# Patient Record
Sex: MALE | Race: WHITE | NOT HISPANIC OR LATINO | Employment: FULL TIME | URBAN - METROPOLITAN AREA
[De-identification: names, ages, dates, MRNs, and addresses within clinical notes are randomized per-mention and may not be internally consistent; named-entity substitution may affect disease eponyms.]

---

## 2017-04-10 ENCOUNTER — ALLSCRIPTS OFFICE VISIT (OUTPATIENT)
Dept: OTHER | Facility: OTHER | Age: 42
End: 2017-04-10

## 2017-07-03 ENCOUNTER — ALLSCRIPTS OFFICE VISIT (OUTPATIENT)
Dept: OTHER | Facility: OTHER | Age: 42
End: 2017-07-03

## 2017-08-10 ENCOUNTER — ALLSCRIPTS OFFICE VISIT (OUTPATIENT)
Dept: OTHER | Facility: OTHER | Age: 42
End: 2017-08-10

## 2018-01-03 ENCOUNTER — OFFICE VISIT (OUTPATIENT)
Dept: URGENT CARE | Facility: CLINIC | Age: 43
End: 2018-01-03
Payer: COMMERCIAL

## 2018-01-03 PROCEDURE — 99213 OFFICE O/P EST LOW 20 MIN: CPT

## 2018-01-06 NOTE — PROGRESS NOTES
Assessment   1  Abrasion of right cornea, initial encounter (918 1) (S05 01XA)   2  Abrasion of sclera, initial encounter (918 2) (C04 0C7Z)    Plan   Abrasion of right cornea, initial encounter, Abrasion of sclera, initial encounter    · Eye Drops Allergy Relief 0 05-0 25 % Ophthalmic Solution; INSTILL 1 DROP INTO    RIGHT EYE 4 TIMES DAILY   · Ondansetron HCl - 4 MG Oral Tablet (Zofran); TAKE 1 TABLET Every 6 hours PRN    nausea/vomiting   · Tobramycin-Dexamethasone 0 3-0 1 % Ophthalmic Suspension; APPLY 1 DROP    Every 6 hours  Nausea and vomiting    · Ondansetron 4 MG Oral Tablet Disintegrating    Discussion/Summary   Discussion Summary:    26-year-old male who sustained abrasions of the cornea and sclera per fluorescein - wood lamp exam  prescribed TobraDex eye ointment for prophylaxis against infection and irritation suppression  Expected to heal within 2-3 days  Zofran prescribed for nausea  Advised to avoid wearing contacts, rubbing his eyes  May wear eye glasses / shades to protect against photosensitivity  Return precautions given to patient  Medication Side Effects Reviewed: Possible side effects of new medications were reviewed with the patient/guardian today  Understands and agrees with treatment plan: The treatment plan was reviewed with the patient/guardian  The patient/guardian understands and agrees with the treatment plan    Counseling Documentation With Imm: The patient was counseled regarding diagnostic results,-- instructions for management,-- impressions  Chief Complaint   1  Eye Pain  Chief Complaint Free Text Note Form: Rt eye pain      History of Present Illness   HPI: 26-year-old otherwise healthy male presents to the urgent care due to right eye trauma and subsequent vomiting  Around 10:30 a m  while at work he was cutting a piece of plastic type and a piece flew off and hit his right eye   Since then he has been trying to irrigate the right eye in an attempt to resolve the sensation of a foreign object in the eye  He still feels that a foreign object is in the eye and has blurry vision  As such he decided to come to the urgent care for evaluation  while here he became nauseated and started vomiting  Hospital Based Practices Required Assessment:      Pain Assessment      the patient states they have pain  The pain is located in the right eye  (on a scale of 0 to 10, the patient rates the pain at 10 )      Abuse And Domestic Violence Screen       Yes, the patient is safe at home  -- The patient states no one is hurting them  Depression And Suicide Screen  No, the patient has not had thoughts of hurting themself  No, the patient has not felt depressed in the past 7 days  Prefered Language is  english  Primary Language is  english  Review of Systems   Focused-Male:      Constitutional: no fever-- and-- no chills  ENT: no earache  Cardiovascular: no chest pain  Respiratory: no shortness of breath-- and-- no cough  Gastrointestinal: nausea-- and-- vomiting, but-- no abdominal pain,-- no constipation-- and-- no diarrhea  Neurological: headache-- and-- dizziness  ROS Reviewed:    ROS reviewed  Active Problems   1  Bipolar I disorder, most recent episode mixed, severe with psychotic features (296 64)     (F31 64)   2  Drug dependence, in remission (304 93) (F19 21)   3  Finger injury, left, initial encounter (959 5) (B46 80AL)   4  Penetrating foreign body of skin of index finger, initial encounter (915 6) (S60 458A)   5  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)    Past Medical History   Active Problems And Past Medical History Reviewed: The active problems and past medical history were reviewed and updated today  Family History   Mother    1  Family history of Colon cancer (153 9) (C18 9)   2  Family history of Hypertension (401 9) (I10)  Father    3   Family history of Hypertension (401 9) (I10)  Maternal Grandmother    4  Family history of Diabetes mellitus (250 00) (E11 9)  Family History Reviewed: The family history was reviewed and updated today  Social History    · Never a smoker   · Tobacco use (305 1) (Z72 0)  Social History Reviewed: The social history was reviewed and updated today  Current Meds    1  AndroGel 25 MG/2 5GM (1%) Transdermal Gel; Therapy: (ADHZSDKD:[id number]) to Recorded   2  BuPROPion HCl ER (XL) 150 MG Oral Tablet Extended Release 24 Hour; TAKE 3     TABLETS DAILY; Therapy: [id number] to (Ta Juan)  Requested for: 06MVK0577; Last     Rx:02Jun2014 Ordered   3  Suboxone 8-2 MG Sublingual Film; Take 1 25 daily SL as directed; Therapy: 27ZYN5359 to (Evaluate:63Mxs3584); Last Rx:02Jun2014 Ordered  Medication List Reviewed: The medication list was reviewed and updated today  Allergies   1  No Known Drug Allergies    Vitals   Signs   Recorded: 99ZEH3127 03:58PM   Temperature: 96 9 F  Heart Rate: 87  Respiration: 16  Systolic: 524  Diastolic: 70  Height: 5 ft 6 5 in  Weight: 172 lb   BMI Calculated: 27 35  BSA Calculated: 1 89  O2 Saturation: 98  Pain Scale: 10    Physical Exam        Constitutional      General appearance: Abnormal  -- Vomiting and been; discomfort from right eye pain and headache  Eyes      Conjunctiva and lids: Abnormal        Pupils and irises: Equal, round and reactive to light  Ears, Nose, Mouth, and Throat      External inspection of ears and nose: Normal        Pulmonary      Respiratory effort: No increased work of breathing or signs of respiratory distress  Auscultation of lungs: Clear to auscultation  Cardiovascular      Auscultation of heart: Normal rate and rhythm, normal S1 and S2, without murmurs  Musculoskeletal      Gait and station: Normal        Skin      Skin and subcutaneous tissue: Normal without rashes or lesions         Psychiatric      Orientation to person, place and time: Normal  Mood and affect: Normal        Results/Data   Diagnostic Studies Reviewed:      Diagnostic Review Fluorescin-wood lap exam: a small abrasion of the central cornea; 2 small abrasions at 7 o'clock on the sclera  No evidence of foreign body        Signatures    Electronically signed by : TIAN Salmeron ; Derick  3 2018  4:58PM EST                       (Author)     Electronically signed by : Lavern John DO; Jan 5 2018  4:43PM EST                       (Co-author)

## 2018-01-10 NOTE — PROGRESS NOTES
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  Mutations in PMS2 are associated with a diagnosis of Ingram syndrome (LS)  Cancer risks associated with PMS2 mutations are typically lower than those seen with other LS genes  Individuals who carry mutations in PMS2 have a 15%-20% lifetime risk to develop colorectal cancer  Female PMS2 carriers have a 15% risk to develop endometrial cancer and a 6% risk for ovarian cancer  Risks are also elevated for gastric, renal pelvis, small bowel, ureter, and brain cancers  We reviewed screening and management recommendations for individuals with PMS2 related Ingram Syndrome  Recommended colorectal cancer screening is colonoscopy performed every 1-2 years beginning at the age of 22  Screening for other Ingram Syndrome related cancers may also be considered based on the family history  We discussed that all first degree relatives have a 50% chance to carry the same PMS2 mutation  The patient's siblings and children should consider the option of genetic testing to determine appropriate medical management  The patient's questions were answered  He declined a follow-up appointment for further discussion at this time  He was encouraged to call with any future questions or concerns  Signatures   Electronically signed by :  Leo Coronado, [facility]; Mar  1 2016  3:06PM EST                       (Author)

## 2018-01-10 NOTE — PSYCH
Provider Comments  Provider Comments:   MSW called ct  "He forgot about his appt  He is very busy at work  He is doing good  Reminded ct of his next appt   He put it is in his phone "      Signatures   Electronically signed by : Paty Gannon LCSW; Aug 10 2017  3:20PM EST                       (Author)

## 2018-01-11 NOTE — PSYCH
Progress Note  Psychotherapy Provided [facility]: Individual Psychotherapy 45 minutes provided today  Goals addressed in session:   D: MSW met with pt for session  His last session was months ago  "He has been doing OK " Discussed home and work  He went on a weeks vacation with friends to an [facility] convention in [facility]  "This was big for his wife regarding trust and his history of addiction " Developed tx plan  A: Mod progress overall  P: To begin working on his tx plan and to follow up more regularly  Pain Scale and Suicide Risk [facility]: Current Pain Assessment: no pain   Current suicide risk is low   Behavioral Health Treatment Plan [facility]: Diagnosis and Treatment Plan explained to patient, patient relates understanding diagnosis and is agreeable to Treatment Plan  Assessment    1  Bipolar I disorder, most recent episode mixed, severe with psychotic features (296 64)   (F31 64)   2  Drug dependence, in remission (304 93) (F19 21)   3   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

## 2018-01-12 NOTE — PSYCH
Progress Note  Psychotherapy Provided [facility]: Individual Psychotherapy 45 minutes provided today  Goals addressed in session:   goal 1  D: MSW met with pt for session  Discussed work, home, family, parents, and work  "Everything is status quo " Has been having problems with a co-worker who is not doing his share and "they (boss) isn't doing anything about it " Also discussed his parents  MSW pointed out the similarities between his co-worker and his parents, both are [employer]  A: Mod progress on goal  P: To focus on self and not co-worker  Pain Scale and Suicide Risk [facility]: Current Pain Assessment: no pain   Current suicide risk is low   Behavioral Health Treatment Plan [facility]: Diagnosis and Treatment Plan explained to patient, patient relates understanding diagnosis and is agreeable to Treatment Plan  Assessment    1  Bipolar I disorder, most recent episode mixed, severe with psychotic features (296 64)   (F31 64)   2   Drug dependence, in remission (304 93) (F19 21)    Signatures   Electronically signed by : Payton Morgan LCSW; Apr 6 2016  2:14PM EST                       (Author)

## 2018-01-15 NOTE — PSYCH
Progress Note  Psychotherapy Provided [facility]: Individual Psychotherapy 45 minutes provided today  Goals addressed in session:   D: MSW met with pt for session  He is decreasing the Suboxone because "he wants to  (Discussed reasons why)  The next step is the shot " He has been very busy with his kids ie, their activities and redoing their rooms  Work is going good  Regarding psych meds he is only on Wellbutrin  "He knows he has to watch it and not push to stay up late to do more things " He had a screening done due to his family history of colon cancer  He has the gene  Developed tx plan  A: Ct appears stable  P: Begin addressing tx plan goals  Pain Scale and Suicide Risk [facility]: Current Pain Assessment: no pain   Current suicide risk is low   Behavioral Health Treatment Plan [facility]: Diagnosis and Treatment Plan explained to patient, patient relates understanding diagnosis and is agreeable to Treatment Plan  Assessment    1  Bipolar I disorder, most recent episode mixed, severe with psychotic features (296 64)   (F31 64)   2   Drug dependence, in remission (304 93) (F19 21)    Signatures   Electronically signed by : Jamie Ballard LCSW; Feb 17 2016  4:11PM EST                       (Author)

## 2018-01-15 NOTE — PSYCH
1  Bipolar I disorder, most recent episode mixed, severe with psychotic features (296 64)   (F31 64)   2  Drug dependence, in remission (304 93) (F19 21)       Date of Current Treatment Plan: 2/16  Strengths/Personal Resources for Self Care: creative  Current Challenges/Problems/Needs: I used to not be good  I am on Suboxone  I have medical issues  Long Term Goals:   I want to continue to do good  Target Date: 6/16      I want to continue the Suboxone reduction      Target Date: 6/16      I want to take care of self  Target Date: 6/16      Short Term Objectives:   Goal 1:   I will continue to stay stay busy  I will continue to with my hobby of [email or web address] cars  I will stay involved with my kids activities  Target Date: 2/16      Goal 2:   I will continue to decrease the amount  I will continue to follow with the Dr   I will remember the shot is there as back up  Target Date: 2/16      Goal 3:   I will take meds as prescribed  I will be aware and monitor my moods  I will follow up with the Dr  regarding the preventive cancer gene programs  Target Date: 3/16      GOAL 1: Modality: Individual 1 x per month                       The first scheduled review date is 6/16  The expected length of service is 6 mons  Patient Signature: _________________________________ Date/Time: ______________        1  Bipolar I disorder, most recent episode mixed, severe with psychotic features (296 64)   (F31 64)   2  Drug dependence, in remission (304 93) (F19 21)   3  Finger injury, left, initial encounter (959 5) (D47 65EI)   4  Penetrating foreign body of skin of index finger, initial encounter (915 6) (S60 458A)   5   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

## 2018-01-16 NOTE — PROGRESS NOTES
Discussion/Summary  Discussion Summary:   The patient was seen for genetic counseling to discuss possible genetic testing, relative to his family history of Ingram syndrome  Family information was reviewed and a 3 generation pedigree was drawn  The patient's mother was diagnosed with ovarian cancer and endometrial cancer at the age of 62, following her diagnosis she underwent genetic testing which indicated that she carries a mutation in the PMS 2 gene  In addition, the patient's maternal grandfather was diagnosed with colon cancer at the age of 48 and a maternal aunt was diagnosed with AML at the age of 62  We discussed cancer risks associated with Ingram syndrome, medical management recommendations, and potential risks for family members  We reviewed the genetic testing process, insurance concerns, and possible test results  The patient elected to pursue genetic testing for the familial PMS 2 mutation  Informed consent was obtained and a DNA sample was collected  The sample was sent to [address] [place] [address] for PMS 2 single site genetic testing  Test results should be available in approximately 2-3 weeks  The patient elected to have results disclosed by phone and he will be contacted once results are available  Signatures   Electronically signed by :  Carlos Do, [facility]; Feb 10 2016  2:07PM EST                       (Author)

## 2018-01-16 NOTE — PSYCH
Provider Comments  Provider Comments:   MSW left message for ct due to no showing appt        Signatures   Electronically signed by : Madeline Hinton LCSW; Jul  3 2017  3:28PM EST                       (Author)

## 2018-01-17 NOTE — PSYCH
Treatment Plan Tracking    #1 Treatment Plan not completed within required time limits due to: Client did not schedule an appointment within 15 days of initial assessment  , Other: Last seen 5 months ago, reviewed history            Signatures   Electronically signed by : Beni Rodriguez LCSW; Sep 14 2016  3:08PM EST                       (Author)

## 2018-01-17 NOTE — PSYCH
Progress Note  Psychotherapy Provided [facility]: Individual Psychotherapy 45 minutes provided today  Goals addressed in session:   goals 1,2,3  D: MSW met with pt  He was last seen in April of this year  Reviewed history since then  "He is taking his meds and continues with the RC cars  His wife has finally accepted it  He has been featured in [employer] and has 6 sponsors " Work is going good  Discussed his relationship with his mother since she became a born again [employer]18 [employer], which is strained, and how it was when he was growing up, close  He had to ask his aunt for money again  A: Pt seems to accept the difference in his mother  Mod progress all goals  P: To think of goals for next tx plan  Pain Scale and Suicide Risk [facility]: Current Pain Assessment: no pain   Current suicide risk is low   Behavioral Health Treatment Plan [employer]: Diagnosis and Treatment Plan explained to patient, patient relates understanding diagnosis and is agreeable to Treatment Plan  Assessment    1  Bipolar I disorder, most recent episode mixed, severe with psychotic features (296 64)   (F31 64)   2  Drug dependence, in remission (304 93) (F19 21)   3   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

## 2018-01-17 NOTE — PSYCH
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)       Date of Current Treatment Plan: 4/10/17  Strengths/Personal Resources for Self Care: creative  Area of Needs: I don't' make eye contact  Long Term Goals:   I want to make eye contact with people when I am holding a conversation with them  Target Date: 8/17              Short Term Objectives:   Goal 1:   I will make eye contact with the person when I say, "Hi "  I will ask wife to remind me to look at her when I am speaking to her  I will look at the person when I realize I am not looking at them  Target Date: 5/17              GOAL 1: Modality: Individual 1 x per month                           The first scheduled review date is 8/17  The expected length of service is 6 months  Patient Signature: _________________________________ Date/Time: ______________        1  Bipolar I disorder, most recent episode mixed, severe with psychotic features (296 64)   (F31 64)   2  Drug dependence, in remission (304 93) (F19 21)   3  Finger injury, left, initial encounter (959 5) (Z01 66YE)   4  Penetrating foreign body of skin of index finger, initial encounter (915 6) (S60 458A)   5   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

## 2018-01-23 VITALS
RESPIRATION RATE: 16 BRPM | WEIGHT: 172 LBS | DIASTOLIC BLOOD PRESSURE: 70 MMHG | HEIGHT: 67 IN | TEMPERATURE: 96.9 F | HEART RATE: 87 BPM | SYSTOLIC BLOOD PRESSURE: 156 MMHG | BODY MASS INDEX: 27 KG/M2 | OXYGEN SATURATION: 98 %

## 2018-05-16 ENCOUNTER — DOCUMENTATION (OUTPATIENT)
Dept: PSYCHIATRY | Facility: CLINIC | Age: 43
End: 2018-05-16

## 2018-05-16 NOTE — PROGRESS NOTES
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) (F90 0)        Subjective:     Patient ID: Balbir Pires is a 36 y o  male  Outpatient Discharge Summary:   Admission Date: 2015  [facility] was referred by Dr Cely Torres  Discharge Date: 5/9/18    Discharge Diagnosis:      1  Bipolar I disorder, most recent episode mixed, severe with psychotic features (296 64)   (F31 64)   2  Drug dependence, in remission (304 93) (F19 21)   3  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) (F90 0)     Treating Physician: Dr Tera Ellis  Treatment Complications: none  Presenting Problem: History of bipolar and drug dependency  Course of treatment includes:    individual therapy    Treatment Progress: fair  He made progress in treatment  He had been clean for a number of years and was stable mentally for some time  A letter was sent due to no pending appts  It appears he had made appts but then cancelled them  Criteria for Discharge: No pending appts  Aftercare recommendations include Follow up with  for meds  Discharge Medications include:  Current Outpatient Prescriptions:     buprenorphine-naloxone (SUBOXONE) 8-2 mg per SL tablet, Place 1 tablet under the tongue daily  , Disp: , Rfl:     buPROPion (FORFIVO XL) 450 MG 24 hr tablet, Take 450 mg by mouth daily  , Disp: , Rfl:     Testosterone (ANDROGEL PUMP TD), Place on the skin, Disp: , Rfl:     Prognosis: fair

## 2018-06-08 ENCOUNTER — HOSPITAL ENCOUNTER (EMERGENCY)
Facility: HOSPITAL | Age: 43
Discharge: HOME | End: 2018-06-08
Attending: EMERGENCY MEDICINE
Payer: OTHER MISCELLANEOUS

## 2018-06-08 VITALS
RESPIRATION RATE: 18 BRPM | BODY MASS INDEX: 25.71 KG/M2 | TEMPERATURE: 96.7 F | WEIGHT: 160 LBS | DIASTOLIC BLOOD PRESSURE: 65 MMHG | HEIGHT: 66 IN | OXYGEN SATURATION: 96 % | SYSTOLIC BLOOD PRESSURE: 103 MMHG | HEART RATE: 65 BPM

## 2018-06-08 DIAGNOSIS — S61.211A LACERATION OF LEFT INDEX FINGER: Primary | ICD-10-CM

## 2018-06-08 PROCEDURE — 99282 EMERGENCY DEPT VISIT SF MDM: CPT

## 2018-06-08 PROCEDURE — 90471 IMMUNIZATION ADMIN: CPT

## 2018-06-08 PROCEDURE — 90715 TDAP VACCINE 7 YRS/> IM: CPT | Performed by: PHYSICIAN ASSISTANT

## 2018-06-08 RX ORDER — LIDOCAINE HYDROCHLORIDE 10 MG/ML
5 INJECTION, SOLUTION EPIDURAL; INFILTRATION; INTRACAUDAL; PERINEURAL ONCE
Status: COMPLETED | OUTPATIENT
Start: 2018-06-08 | End: 2018-06-08

## 2018-06-08 RX ORDER — GINSENG 100 MG
1 CAPSULE ORAL ONCE
Status: COMPLETED | OUTPATIENT
Start: 2018-06-08 | End: 2018-06-08

## 2018-06-08 RX ADMIN — Medication 1 SMALL APPLICATION: at 13:33

## 2018-06-08 RX ADMIN — TETANUS TOXOID, REDUCED DIPHTHERIA TOXOID AND ACELLULAR PERTUSSIS VACCINE, ADSORBED 0.5 ML: 5; 2.5; 8; 8; 2.5 SUSPENSION INTRAMUSCULAR at 13:13

## 2018-06-08 RX ADMIN — LIDOCAINE HYDROCHLORIDE 5 ML: 10 INJECTION, SOLUTION EPIDURAL; INFILTRATION; INTRACAUDAL; PERINEURAL at 13:33

## 2018-06-08 NOTE — ED PROVIDER NOTES
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 and he cut his finger on sheet metal   Patient denies numbness/tingling  History provided by:  Patient  Finger Laceration   Location:  Finger  Finger laceration location:  L index finger  Length:  1 5cm  Depth:  Cutaneous  Quality: straight    Bleeding: controlled    Time since incident:  30 minutes  Laceration mechanism:  Metal edge  Pain details:     Quality:  Aching    Severity:  Mild    Timing:  Constant    Progression:  Unchanged  Foreign body present:  No foreign bodies  Relieved by:  Nothing  Worsened by:  Nothing  Ineffective treatments:  None tried  Tetanus status:  Unknown  Associated symptoms: no fever, no numbness, no redness, no swelling and no streaking        Prior to Admission Medications   Prescriptions Last Dose Informant Patient Reported? Taking? 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, and alcohol       Past Surgical History:   Procedure Laterality Date    SMALL INTESTINE SURGERY         History reviewed  No pertinent family history  I have reviewed and agree with the history as documented  Social History   Substance Use Topics    Smoking status: Former Smoker    Smokeless tobacco: Never Used    Alcohol use No        Review of Systems   Constitutional: Negative for chills and fever     Skin: Positive for wound  Neurological: Negative for dizziness, weakness and numbness  All other systems reviewed and are negative  Physical Exam  Physical Exam   Constitutional: He is oriented to person, place, and time  He appears well-developed and well-nourished  HENT:   Head: Normocephalic and atraumatic  Eyes: Conjunctivae are normal    Neck: Normal range of motion  Cardiovascular: Normal rate  Pulses:       Radial pulses are 2+ on the left side  Pulmonary/Chest: Effort normal    Musculoskeletal:        Left hand: He exhibits tenderness and laceration  He exhibits normal range of motion, no bony tenderness, normal capillary refill, no deformity and no swelling  Normal sensation noted  Normal strength noted  Hands:  Neurological: He is alert and oriented to person, place, and time  He has normal strength  No sensory deficit  Gait normal    Skin: Skin is warm and dry  Laceration (1 5cm linear laceration to left index finger  ) noted  No rash noted  He is not diaphoretic  No pallor  Nursing note and vitals reviewed        Vital Signs  ED Triage Vitals [06/08/18 1257]   Temperature Pulse Respirations Blood Pressure SpO2   (!) 96 7 °F (35 9 °C) 69 20 136/79 --      Temp src Heart Rate Source Patient Position - Orthostatic VS BP Location FiO2 (%)   -- -- -- -- --      Pain Score       4           Vitals:    06/08/18 1257   BP: 136/79   Pulse: 69       Visual Acuity  Visual Acuity      Most Recent Value   L Pupil Size (mm)  3   R Pupil Size (mm)  3          ED Medications  Medications   lidocaine (PF) (XYLOCAINE-MPF) 1 % injection 5 mL (5 mL Infiltration Given 6/8/18 1333)   bacitracin topical ointment 1 small application (1 small application Topical Given 6/8/18 1333)   tetanus-diphtheria-acellular pertussis (BOOSTRIX) IM injection 0 5 mL (0 5 mL Intramuscular Given 6/8/18 1313)       Diagnostic Studies  Results Reviewed     None                 No orders to display              Procedures  Lac Repair  Date/Time: 6/8/2018 1:20 PM  Performed by: Bettina Price by: Kelly Francis   Consent: Verbal consent obtained  Body area: upper extremity  Location details: left index finger  Laceration length: 1 5 cm  Foreign bodies: no foreign bodies  Tendon involvement: none  Nerve involvement: none  Vascular damage: no  Anesthesia: local infiltration    Anesthesia:  Local Anesthetic: lidocaine 1% without epinephrine  Anesthetic total: 2 mL      Procedure Details:  Preparation: Patient was prepped and draped in the usual sterile fashion  Irrigation solution: saline  Irrigation method: tap  Amount of cleaning: standard  Debridement: none  Degree of undermining: none  Skin closure: 4-0 nylon  Number of sutures: 5  Technique: simple  Approximation: close  Approximation difficulty: simple  Dressing: antibiotic ointment and gauze roll  Patient tolerance: Patient tolerated the procedure well with no immediate complications             Phone Contacts  ED Phone Contact    ED Course                               MDM  Number of Diagnoses or Management Options  Laceration of left index finger: minor  Patient Progress  Patient progress: improved    CritCare Time    Disposition  Final diagnoses:   Laceration of left index finger     Time reflects when diagnosis was documented in both MDM as applicable and the Disposition within this note     Time User Action Codes Description Comment    6/8/2018  1:29 PM Lanning Dakins Add [I58 [id number]] Laceration of left index finger       ED Disposition     ED Disposition Condition Comment    Discharge  [place] discharge to home/self care      Condition at discharge: Stable        Follow-up Information     Follow up With Specialties Details Why Contact Info    Shoshana Hicks, [address], Internal Medicine, Nurse Practitioner In 1 week For suture removal [address] [place] [address] Destinee Hurtado  [phone]            Patient's Medications Discharge Prescriptions    No medications on file     No discharge procedures on file      ED Provider  Electronically Signed by           Obey De La Cruz PA-C  06/08/18 5391

## 2018-06-08 NOTE — DISCHARGE INSTRUCTIONS
Rest, elevate hand  Tylenol/motrin for discomfort  Follow up with occupational medicine doctor in 7-10 days for suture removal   Keep wound clean and dry for 2 days, then clean daily with soap and water and apply antibiotic ointment and bandaid  Finger Laceration   WHAT YOU NEED TO KNOW:   A finger laceration is a deep cut in your skin  It is often caused by a sharp object, such as a knife, or blunt force to your finger  Your blood vessels, bones, joints, tendons, or nerves may also be injured  DISCHARGE INSTRUCTIONS:   Return to the emergency department if:   · Your wound comes apart  · Blood soaks through your bandage  · You have severe pain in your finger or hand  · Your finger is pale and cold  · You have sudden trouble moving your finger  · Your swelling suddenly gets worse  · You have red streaks on your skin coming from your wound  Contact your healthcare provider or hand specialist if:   · You have new numbness or tingling  · Your finger feels warm, looks swollen or red, and is draining pus  · You have a fever  · You have questions or concerns about your condition or care  Medicines: You may  need any of the following:  · Antibiotics  help prevent a bacterial infection  · Acetaminophen  decreases pain and fever  It is available without a doctor's order  Ask how much to take and how often to take it  Follow directions  Read the labels of all other medicines you are using to see if they also contain acetaminophen, or ask your doctor or pharmacist  Acetaminophen can cause liver damage if not taken correctly  Do not use more than 4 grams (4,000 milligrams) total of acetaminophen in one day  · Prescription pain medicine  may be given  Ask your healthcare provider how to take this medicine safely  Some prescription pain medicines contain acetaminophen  Do not take other medicines that contain acetaminophen without talking to your healthcare provider  Too much acetaminophen may cause liver damage  Prescription pain medicine may cause constipation  Ask your healthcare provider how to prevent or treat constipation  · Take your medicine as directed  Contact your healthcare provider if you think your medicine is not helping or if you have side effects  Tell him or her if you are allergic to any medicine  Keep a list of the medicines, vitamins, and herbs you take  Include the amounts, and when and why you take them  Bring the list or the pill bottles to follow-up visits  Carry your medicine list with you in case of an emergency  Self-care:   · Apply ice  on your finger for 15 to 20 minutes every hour or as directed  Use an ice pack, or put crushed ice in a plastic bag  Cover it with a towel before you apply it to your skin  Ice helps prevent tissue damage and decreases swelling and pain  · Elevate  your hand above the level of your heart as often as you can  This will help decrease swelling and pain  Prop your hand on pillows or blankets to keep it elevated comfortably  · Wear your splint as directed  A splint will decrease movement and stress on your wound  The splint may help your wound heal faster  Ask your healthcare provider how to apply and remove a splint  · Apply ointments to decrease scarring  Do not apply ointments until your healthcare provider says it is okay  You may need to wait until your wound is healed  Ask which ointment to buy and how often to use it  Wound care:   · Do not get your wound wet until your healthcare provider says it is okay  Do not soak your hand in water  Do not go swimming until your healthcare provider says it is okay  When your healthcare provider says it is okay, carefully wash around the wound with soap and water  Let soap and water run over your wound  Gently pat the area dry or allow it to air dry  · Change your bandages when they get wet, dirty, or after washing    Apply new, clean bandages as directed  Do not apply elastic bandages or tape too tightly  Do not put powders or lotions on your wound  · Apply antibiotic ointment as directed  Your healthcare provider may give you antibiotic ointment to put over your wound if you have stitches  If you have Strips-Strips over your wound, let them dry up and fall off on their own  If they do not fall off within 14 days, gently remove them  If you have glue over your wound, do not remove or pick at it  If your glue comes off, do not replace it with glue that you have at home  · Check your wound every day for signs of infection  Signs of infection include swelling, redness, or pus  Follow up with your healthcare provider or hand specialist in 2 days:  Write down your questions so you remember to ask them during your visits  © 2017 [address] Information is for End User's use only and may not be sold, redistributed or otherwise used for commercial purposes  All illustrations and images included in CareNotes® are the copyrighted property of A D [employer] M [employer]  or [employer]  The above information is an  only  It is not intended as medical advice for individual conditions or treatments  Talk to your doctor, nurse or pharmacist before following any medical regimen to see if it is safe and effective for you

## 2018-06-17 ENCOUNTER — HOSPITAL ENCOUNTER (EMERGENCY)
Facility: HOSPITAL | Age: 43
Discharge: HOME | End: 2018-06-17
Attending: EMERGENCY MEDICINE
Payer: OTHER MISCELLANEOUS

## 2018-06-17 DIAGNOSIS — L08.9 WOUND INFECTION: Primary | ICD-10-CM

## 2018-06-17 DIAGNOSIS — T14.8XXA WOUND INFECTION: Primary | ICD-10-CM

## 2018-06-17 PROCEDURE — 99282 EMERGENCY DEPT VISIT SF MDM: CPT

## 2018-06-17 RX ORDER — CEPHALEXIN 500 MG/1
500 CAPSULE ORAL 3 TIMES DAILY
Qty: 15 CAPSULE | Refills: 0 | Status: SHIPPED | OUTPATIENT
Start: 2018-06-17 | End: 2018-06-22

## 2018-06-17 RX ORDER — IBUPROFEN 600 MG/1
600 TABLET ORAL EVERY 8 HOURS PRN
Qty: 30 TABLET | Refills: 0 | Status: SHIPPED | OUTPATIENT
Start: 2018-06-17

## 2018-06-17 RX ORDER — CEPHALEXIN 250 MG/1
500 CAPSULE ORAL ONCE
Status: COMPLETED | OUTPATIENT
Start: 2018-06-17 | End: 2018-06-17

## 2018-06-17 RX ORDER — IBUPROFEN 600 MG/1
600 TABLET ORAL ONCE
Status: COMPLETED | OUTPATIENT
Start: 2018-06-17 | End: 2018-06-17

## 2018-06-17 RX ORDER — GINSENG 100 MG
1 CAPSULE ORAL ONCE
Status: COMPLETED | OUTPATIENT
Start: 2018-06-17 | End: 2018-06-17

## 2018-06-17 RX ADMIN — CEPHALEXIN 500 MG: 250 CAPSULE ORAL at 20:14

## 2018-06-17 RX ADMIN — IBUPROFEN 600 MG: 600 TABLET, FILM COATED ORAL at 20:15

## 2018-06-17 RX ADMIN — BACITRACIN ZINC 1 SMALL APPLICATION: 500 OINTMENT TOPICAL at 20:08

## 2018-06-17 NOTE — ED PROVIDER NOTES
History  Chief Complaint   Patient presents with    Wound Check     Patient presents to ED with complaints of puss and concerns of infection on right finger  Pt was seen 9 days ago for laceration and was sutured in this ED  35 yo male who had finger lac 9 days ago and was sutured here, supposed to get sutures out tomorrow but yesterday noted a little pain and swelling, today began with purulent discharge  No fever/chills  Pt has not been on any antibiotics, tetanus was updated  History provided by:  Patient   used: No    Wound Check    He was treated in the ED 5 to 10 days ago  Previous treatment in the ED includes laceration repair  There has been no treatment since the wound repair  Wound drainage status: purulent drainage  There is no redness present  There is new swelling present  There is new pain present  He has no difficulty moving the affected extremity or digit  Prior to Admission Medications   Prescriptions Last Dose Informant Patient Reported? Taking? 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, and alcohol       Past Surgical History:   Procedure Laterality Date    SMALL INTESTINE SURGERY         History reviewed  No pertinent family history  I have reviewed and agree with the history as documented  Social History   Substance Use Topics    Smoking status: Former Smoker    Smokeless tobacco: Never Used    Alcohol use No        Review of Systems   Constitutional: Negative for chills and fever  HENT: Negative for congestion and sore throat  Eyes: Negative for visual disturbance     Respiratory: Negative for shortness of breath and wheezing  Cardiovascular: Negative for chest pain and palpitations  Gastrointestinal: Negative for abdominal pain, diarrhea, nausea and vomiting  Genitourinary: Negative for dysuria  Musculoskeletal: Negative for neck pain and neck stiffness  Skin: Positive for wound (L index finger wound pain, discharge )  Negative for pallor and rash  Neurological: Negative for headaches  Psychiatric/Behavioral: Negative for confusion  All other systems reviewed and are negative  Physical Exam  Physical Exam   Constitutional: He is oriented to person, place, and time  He appears well-developed and well-nourished  No distress  HENT:   Head: Normocephalic and atraumatic  Right Ear: External ear normal    Left Ear: External ear normal    Mouth/Throat: Oropharynx is clear and moist    Eyes: EOM are normal    Neck: Neck supple  Cardiovascular: Normal rate and regular rhythm  No murmur heard  Pulmonary/Chest: Effort normal and breath sounds normal    Abdominal: Soft  Bowel sounds are normal  He exhibits no distension  There is no tenderness  Musculoskeletal: Normal range of motion  He exhibits no edema  Neurological: He is alert and oriented to person, place, and time  Skin: Skin is warm  No rash noted  No pallor  L index finger wound - poor approximation, no redness or discharge noted - NV intact distally, no lymphangitic streaking   Psychiatric: He has a normal mood and affect  His behavior is normal    Nursing note and vitals reviewed        Vital Signs  ED Triage Vitals   Temperature Pulse Respirations Blood Pressure SpO2   06/17/18 1954 06/17/18 1954 06/17/18 1954 06/17/18 1954 06/17/18 1954   97 5 °F (36 4 °C) 78 20 139/86 98 %      Temp Source Heart Rate Source Patient Position - Orthostatic VS BP Location FiO2 (%)   06/17/18 1954 06/17/18 1954 06/17/18 1954 06/17/18 1954 --   Tympanic Monitor Sitting Right arm       Pain Score       06/17/18 2015 1           Vitals: 06/17/18 1954 06/17/18 2000   BP: 139/86 130/84   Pulse: 78 80   Patient Position - Orthostatic VS: Sitting        Visual Acuity      ED Medications  Medications   bacitracin topical ointment 1 small application (1 small application Topical Given 6/17/18 2008)   cephalexin (KEFLEX) capsule 500 mg (500 mg Oral Given 6/17/18 2014)   ibuprofen (MOTRIN) tablet 600 mg (600 mg Oral Given 6/17/18 2015)       Diagnostic Studies  Results Reviewed     None                 No orders to display              Procedures  Suture Removal  Date/Time: 6/17/2018 8:03 PM  Performed by: Baljeet Georges by: Josef Sow     Patient location:  ED  Consent:     Consent obtained:  Verbal    Consent given by:  Patient    Risks discussed:  Bleeding, pain and wound separation    Alternatives discussed:  No treatment  Universal protocol:     Procedure explained and questions answered to patient or proxy's satisfaction: yes      Patient identity confirmed:  Verbally with patient  Location:     Laterality:  Left    Location:  Upper extremity    Upper extremity location:  Hand    Hand location:  L index finger  Procedure details: Tools used:  Suture removal kit    Wound appearance:  Tender (slight swelling)    Number of sutures removed:  5  Post-procedure details:     Post-removal:  Antibiotic ointment applied (splint applied)    Patient tolerance of procedure: Tolerated well, no immediate complications           Phone Contacts  ED Phone Contact    ED Course  ED Course as of Jun 17 2021   Sun Jun 17, 2018   1950 Pt seen and examined  35 yo male who had finger lac 9 days ago and was sutured here, supposed to get sutures out tomorrow but yesterday noted a little pain and swelling, today began with purulent discharge  No fever/chills  Pt has not been on any antibiotics, tetanus was updated  No redness, no discharge, no lymphangitic streaking    Will remove 5 sutures as wound only mild dehisced centrally but superficial, apply bacitracin and splint  Discussed importance of warm soaks, splint and f/u with PCP  We discussed fact that pt should not be squeezing wound and try to limit bending as although there is slight superficial dehiscence, wound healing by secondary intention  Pt in agreement with plan  MDM  CritCare Time    Disposition  Final diagnoses:   Wound infection - Left index     Time reflects when diagnosis was documented in both MDM as applicable and the Disposition within this note     Time User Action Codes Description Comment    6/17/2018  7:59 PM Ambreen Noe, [place]  8XXA,  L08 9] Wound infection     6/17/2018  7:59 PM Ambreen Medicine, [address]  8XXA,  L08 9] Wound infection Left index      ED Disposition     ED Disposition Condition Comment    Discharge  Mauricio Muse discharge to home/self care  Condition at discharge: Good        Follow-up Information     Follow up With Specialties Details Why [address] [place] [address], [address] [place] [address], Internal Medicine, Nurse Practitioner Schedule an appointment as soon as possible for a visit  [address] [place] [address]   [phone]            Discharge Medication List as of 6/17/2018  8:01 PM      START taking these medications    Details   cephalexin (KEFLEX) 500 mg capsule Take 1 capsule (500 mg total) by mouth 3 (three) times a day for 5 days, Starting Sun 6/17/2018, Until Fri 6/22/2018, Print      ibuprofen (MOTRIN) 600 mg tablet Take 1 tablet (600 mg total) by mouth every 8 (eight) hours as needed for mild pain or fever, Starting Sun 6/17/2018, Print         CONTINUE these medications which have NOT CHANGED    Details   buprenorphine-naloxone (SUBOXONE) 8-2 mg per SL tablet Place 1 tablet under the tongue daily  , Until Discontinued, Historical Med      buPROPion (FORFIVO XL) 450 MG 24 hr tablet Take 450 mg by mouth daily  , Until Discontinued, Historical Med           No discharge procedures on file     ED Provider  Electronically Signed by           [facility] Beka, DO  06/17/18 2021

## 2018-06-18 VITALS
DIASTOLIC BLOOD PRESSURE: 84 MMHG | WEIGHT: 160.05 LBS | OXYGEN SATURATION: 97 % | SYSTOLIC BLOOD PRESSURE: 130 MMHG | HEIGHT: 66 IN | HEART RATE: 80 BPM | RESPIRATION RATE: 20 BRPM | BODY MASS INDEX: 25.72 KG/M2 | TEMPERATURE: 97.5 F

## 2018-06-18 NOTE — DISCHARGE INSTRUCTIONS
Wound Infection   WHAT YOU NEED TO KNOW:   A wound infection occurs when bacteria enters a break in the skin  The infection may involve just the skin, or affect deeper tissues or organs close to the wound  DISCHARGE INSTRUCTIONS:   Seek care immediately if:   · You feel short of breath  · Your heart is beating faster than usual      · You feel confused  · Blood soaks through your bandages  · Your wound comes apart or feels like it is ripping  · You have severe pain  · You see red streaks coming from the infected area  Contact your healthcare provider if:   · You have a fever or chills  · You have more pain, redness, or swelling near your wound  · Your symptoms do not improve  · The skin around your wound feels numb  · You have questions or concerns about your condition or care  Medicines: You may need any of the following:  · NSAIDs , such as ibuprofen, help decrease swelling, pain, and fever  This medicine is available with or without a doctor's order  NSAIDs can cause stomach bleeding or kidney problems in certain people  If you take blood thinner medicine, always ask your healthcare provider if NSAIDs are safe for you  Always read the medicine label and follow directions  · Antibiotics  help treat a bacterial infection  · Take your medicine as directed  Contact your healthcare provider if you think your medicine is not helping or if you have side effects  Tell him or her if you are allergic to any medicine  Keep a list of the medicines, vitamins, and herbs you take  Include the amounts, and when and why you take them  Bring the list or the pill bottles to follow-up visits  Carry your medicine list with you in case of an emergency  Care for your wound as directed:  Keep your wound clean and dry  You may need to cover your wound when you bathe so it does not get wet  Clean your wound as directed with soap and water or wound    Put on new, clean bandages as directed  Change your bandages when they get wet or dirty  Help your wound heal:   · Eat a variety of healthy foods  Examples include fruits, vegetables, whole-grain breads, low-fat dairy products, beans, lean meats, and fish  Healthy foods may help you heal faster  You may also need to take vitamins and minerals  Ask if you need to be on a special diet  · Manage other health conditions  Follow your healthcare provider's directions to manage health conditions that can cause slow wound healing  Examples include high blood pressure and diabetes  · Do not smoke  Nicotine and other chemicals in cigarettes and cigars can cause slow wound healing  Ask your healthcare provider for information if you currently smoke and need help to quit  E-cigarettes or smokeless tobacco still contain nicotine  Talk to your healthcare provider before you use these products  Follow up with your healthcare provider in 1 to 2 days:  Write down your questions so you remember to ask them during your visits  © 2017 [address] [employer] Information is for End User's use only and may not be sold, redistributed or otherwise used for commercial purposes  All illustrations and images included in CareNotes® are the copyrighted property of [employer]  or Robin Frost  The above information is an  only  It is not intended as medical advice for individual conditions or treatments  Talk to your doctor, nurse or pharmacist before following any medical regimen to see if it is safe and effective for you  Wound Healing and Your Diet   WHAT YOU NEED TO KNOW:   Your body uses nutrients from healthy foods to heal wounds caused by injury, surgery, or pressure ulcers  There is no special diet that will heal your wound, but a healthy meal plan can help your wound heal faster  Nutrients that are important for healing are protein, zinc, and vitamin C   Liquids are also important for wound healing  DISCHARGE INSTRUCTIONS:   Follow a healthy meal plan:   · Eat a variety of foods from each food group every day  Eat regular meals and snacks to help you get enough calories and nutrients  If you have trouble eating 3 meals each day, eat 5 to 6 small meals throughout the day instead  Include good sources of protein, zinc, and vitamin C each day  · Drink liquids as directed  Drink plenty of liquids during and between meals, unless your healthcare provider has told you to limit liquids  Ask your healthcare provider or dietitian how much liquid to drink each day and which liquids are best for you  · Limit unhealthy foods,  such as those that are high in fat, sugar, and salt  Examples include doughnuts, cookies, fried foods, candy, and regular soda  These kinds of foods are low in nutrients that are important for healing  Good sources of protein:  Your dietitian will tell you how much protein and how many calories you need each day  The average amount of protein in foods is listed below in grams (g)  To find the exact amount of protein in a food, read the food labels on packaged items    · Dairy:      ¨ 1 cup of any type of milk (8 g)    ¨ ½ cup of evaporated canned milk (9 g)    ¨ ¼ cup of nonfat dry milk (11 g)    ¨ 1 ounce of semi-hard or solid cheese (7 g)    ¨ ¼ cup of parmesan cheese (8 g)    ¨ ½ cup of cottage cheese (14 g)    ¨ ½ cup of pudding (4 g)    ¨ 1 cup of plain or fruit yogurt (8 g)    · Meats and meat substitutes:      ¨ 3 ounces of cooked freshwater fish (21 g)     ¨ 3 ounces of cooked shellfish (19 g)    ¨ ½ cup of canned tuna (14 g)    ¨ 3 ounces of cooked chicken, turkey, or other poultry (24 g)    ¨ 3 ounces of cooked beef, pork, lamb, or other red meat (21 g)    ¨ 1 large egg (6 g)    ¨ ¼ cup of fat-free egg substitute (5 g)    ¨ ½ cup of tofu or tempeh (10 g)    ¨ 1 cup of cooked dried beans, such as rocha, kidney, or navy (15 g)    · Nuts and seeds:      ¨ 2 tablespoons of almonds, cashews, sunflower seeds, or walnuts (5 g)    ¨ 2 tablespoons of peanuts (7 g)    ¨ 2 tablespoons of peanut butter (8 g)  How to add extra protein:   · Add powdered milk to milk, cereals, scrambled eggs, soups, and casseroles  · Add cheese to sauces, soups, or vegetables  · Add eggs to tuna, salads, sauces, or casseroles  · Add nutrition supplements and breakfast drink mixes to milk or shakes  · Add nuts to foods or eat them as snacks  · Add meat (beef, chicken, or pork) to soups, casseroles, pasta dishes, or vegetables  · Add beans, peas, and other legumes to salads  · Eat cottage cheese or yogurt with fruit  Good sources of vitamin C:  Vitamin C is found in fruits and vegetables  Fruits such as oranges, strawberries, grapefruit, cantaloupe, and tangerines are good sources of vitamin C  Red and green bell peppers, broccoli, potatoes, tomatoes, and cabbage are also high in vitamin C   Good sources of zinc:  Good sources of zinc are beef, liver, and crab  Smaller amounts of zinc are found in sunflower seeds, almonds, peanut butter, eggs, and milk  Other foods that contain zinc include wheat germ, black-eyed peas, and whole-grain products  How to add extra calories to foods: Your dietitian may recommend that you add extra calories to your meals if you are not eating enough  You can increase calories by adding butter, margarine, sugar, or jam to foods  Work with your dietitian if you have other medical conditions and need to follow a special diet  What else you should know about nutrients and wound healing: Your healthcare provider or dietitian may recommend vitamin and nutrition supplements if your body is low in certain nutrients  If your dietitian recommends a liquid nutrition supplement, take it between meals  Ask your healthcare provider which supplements are right for you     © 2017 [facility] Information is for End User's use only and may not be sold, redistributed or otherwise used for commercial purposes  All illustrations and images included in CareNotes® are the copyrighted property of [employer]  or Robin Frost  The above information is an  only  It is not intended as medical advice for individual conditions or treatments  Talk to your doctor, nurse or pharmacist before following any medical regimen to see if it is safe and effective for you  Wound Dehiscence   WHAT YOU NEED TO KNOW:   Wound dehiscence is when part or all of a wound comes apart  You may need medicine, wound care, surgery, or wound devices to help treat your wound  Wound dehiscence can become life-threatening  DISCHARGE INSTRUCTIONS:   Seek care immediately if:   · Your heart is beating faster than usual, or you feel dizzy or lightheaded  · Blood soaks through your bandage  · You see tissue coming through your wound  · You feel like your wound is opening up more  · Your wound oozes yellow or green pus, looks swollen or red, or feels warm  Contact your healthcare provider or surgeon if:   · You have a fever or chills  · Your wound leaks fluid or a small amount of blood  · Your pain gets worse or does not get better after you take pain medicine  · You have nausea or are vomiting  · You have questions or concerns about your condition or care  Medicines:   · Antibiotics  help treat a bacterial infection  · Prescription pain medicine  may be given  Ask your healthcare provider how to take this medicine safely  Some prescription pain medicines contain acetaminophen  Do not take other medicines that contain acetaminophen without talking to your healthcare provider  Too much acetaminophen may cause liver damage  Prescription pain medicine may cause constipation  Ask your healthcare provider how to prevent or treat constipation  · Take your medicine as directed    Contact your healthcare provider if you think your medicine is not helping or if you have side effects  Tell him or her if you are allergic to any medicine  Keep a list of the medicines, vitamins, and herbs you take  Include the amounts, and when and why you take them  Bring the list or the pill bottles to follow-up visits  Carry your medicine list with you in case of an emergency  Wound care:   · Wash your hands often  Use soap and water  Wash your hands before and after you touch your wound  This will help to prevent an infection  · Clean your wound as directed  Ask your healthcare provider if it okay to shower or take a bath  Let the soap and water run over your wound  Gently pat the area dry  Look for signs of infection, such as redness, swelling, or pus  · Change your bandages as directed  Replace bandages after you clean the wound or bathe  Change your bandages when they get wet or dirty  If directed, pack your wound  Change the packing as directed  · Do not swim or go in hot tubs until your healthcare provider says it is okay  Hot tubs and pools can cause infection and prevent wound healing  · Wear your binder or splint at all times or as directed  These devices help hold your wound together  · Use devices as directed to help the wound heal   Your healthcare provider will show you how to care for your wound device  Self-care:   · Rest as directed  Do not lift anything heavier than 5 pounds  Do not do activities that may put stress on your wound, such as running or sports  Ask your healthcare provider when you can return to your usual activities  · Eat foods high in protein  Protein will help your wound heal  Protein can be found in lean meat, fish, beans, and low-fat dairy  Your healthcare provider may also recommend certain drinks for added protein  · Do not smoke  Nicotine and other chemicals in cigarettes and cigars can prevent your wound from healing   Ask your healthcare provider for information if you currently smoke and need help to quit  E-cigarettes or smokeless tobacco still contain nicotine  Talk to your healthcare provider before you use these products  Follow up with your healthcare provider or surgeon as directed: You will need to return to have your wound checked  Write down your questions so you remember to ask them during your visits  © 2017 [employer]  Information is for End User's use only and may not be sold, redistributed or otherwise used for commercial purposes  All illustrations and images included in CareNotes® are the copyrighted property of [employer]  or Robin Frost  The above information is an  only  It is not intended as medical advice for individual conditions or treatments  Talk to your doctor, nurse or pharmacist before following any medical regimen to see if it is safe and effective for you  Warm Compress or Soak   WHAT YOU NEED TO KNOW:   A warm compress or soak helps improve blood flow to tissues and relieve pain and swelling  This will help you heal from an injury or illness  You may need a warm compress or soak to help manage any of the following:  · A sinus infection or upper respiratory infection    · A blocked tear duct, eye infection, or a stye    · A skin abscess or infection    · An ingrown toenail    · An ear infection    · A soft or deep tissue injury    · A muscle or joint injury, such as a sprain  DISCHARGE INSTRUCTIONS:   Contact your healthcare provider if:   · Your symptoms do not improve or you have new symptoms  · You see blisters on the area where you applied the compress or soak  · You have questions or concerns about your condition or care  How to prepare and use a moist warm compress: Your healthcare provider will tell you how often to apply a warm compress:  · Wash your hands  · Use a washcloth, small towel, or gauze as your compress      · You can place the compress under running water or place it in a bowl with warm water  Check the temperature of the water with a thermometer  The water should not be warmer than 100°F for babies, 105°F for children, and 120°F for adults  Adults should use water that is 105°F if they will apply the compress to an eye  · If directed, add 1 tablespoon of salt to the water  Squeeze extra water out of the compress  · Place the compress directly on the area  If directed, gently massage the area with the compress  Check your skin in 2 minutes for blisters or bright red skin  Your skin should look pink to light red  · You may need to rewarm the compress every 5 minutes  · Remove the compress in 15 to 30 minutes, or when the compress starts to feel cold  Gently pat your skin dry with a clean towel  · Wash your hands  · Reapply the compress as many times as directed each day  Use a clean compress every time  How to use a dry warm compress:  A dry compress may be a hot water bottle or a heating pad  You can also buy a prepared hot pack  Follow the package directions for how to use these devices  Cover a bottle or hot pack with a towel before you apply it to your skin  Do not leave a dry compress on your skin for more than 20 minutes or as directed  Do not fall asleep with a dry compress on your skin  A dry compress may burn your skin if it is left on for too long  How to prepare and use a warm soak:   · Fill a clean container or tub with warm water and soap  The container should be deep enough to cover the area completely  · Check the temperature of the water with a thermometer  The water should not be warmer than 100°F for children and babies, and 110°F for adults  · If directed, add 1 tablespoon of salt to the water  · Remove any bandages  · Soak the area for 30 minutes or as long as directed  Gently pat your skin dry when you are done soaking  · Replace bandages as directed  · Clean the container or tub when finished       · Wash your hands   Follow up with your healthcare provider as directed:  Write down your questions so you remember to ask them during your visits  © 2017 [address] Information is for End User's use only and may not be sold, redistributed or otherwise used for commercial purposes  All illustrations and images included in CareNotes® are the copyrighted property of [employer]  or Robin Frost  The above information is an  only  It is not intended as medical advice for individual conditions or treatments  Talk to your doctor, nurse or pharmacist before following any medical regimen to see if it is safe and effective for you

## 2018-06-18 NOTE — ED NOTES
Patient has noted sutures left index finger  Sutures intact, no redness, no warmth noted       Ondina Salcedo RN  06/17/18 2020

## 2024-09-16 LAB — HBA1C MFR BLD HPLC: 5.7 %

## 2024-09-17 ENCOUNTER — OFFICE VISIT (OUTPATIENT)
Dept: FAMILY MEDICINE CLINIC | Facility: HOSPITAL | Age: 49
End: 2024-09-17
Payer: COMMERCIAL

## 2024-09-17 VITALS
HEART RATE: 92 BPM | SYSTOLIC BLOOD PRESSURE: 136 MMHG | DIASTOLIC BLOOD PRESSURE: 80 MMHG | BODY MASS INDEX: 31.21 KG/M2 | TEMPERATURE: 98.4 F | WEIGHT: 194.2 LBS | HEIGHT: 66 IN | OXYGEN SATURATION: 98 %

## 2024-09-17 DIAGNOSIS — K59.03 THERAPEUTIC OPIOID INDUCED CONSTIPATION: Primary | ICD-10-CM

## 2024-09-17 DIAGNOSIS — T40.2X5A THERAPEUTIC OPIOID INDUCED CONSTIPATION: Primary | ICD-10-CM

## 2024-09-17 DIAGNOSIS — Z00.00 ROUTINE CHECK-UP: ICD-10-CM

## 2024-09-17 DIAGNOSIS — F31.9 BIPOLAR 1 DISORDER (HCC): ICD-10-CM

## 2024-09-17 DIAGNOSIS — Z15.09 LYNCH SYNDROME: ICD-10-CM

## 2024-09-17 DIAGNOSIS — E66.9 CLASS 1 OBESITY WITH BODY MASS INDEX (BMI) OF 31.0 TO 31.9 IN ADULT, UNSPECIFIED OBESITY TYPE, UNSPECIFIED WHETHER SERIOUS COMORBIDITY PRESENT: ICD-10-CM

## 2024-09-17 DIAGNOSIS — F32.A DEPRESSION, UNSPECIFIED DEPRESSION TYPE: ICD-10-CM

## 2024-09-17 PROCEDURE — 99204 OFFICE O/P NEW MOD 45 MIN: CPT

## 2024-09-17 RX ORDER — SENNOSIDES 8.6 MG
8.6 TABLET ORAL
Qty: 90 TABLET | Refills: 0 | Status: SHIPPED | OUTPATIENT
Start: 2024-09-17

## 2024-09-17 RX ORDER — BUPROPION HYDROCHLORIDE 150 MG/1
TABLET ORAL
Qty: 64 TABLET | Refills: 0 | Status: SHIPPED | OUTPATIENT
Start: 2024-09-17 | End: 2024-10-21

## 2024-09-17 NOTE — PROGRESS NOTES
Ambulatory Visit  Name: Salazar Lindsay      : 1975      MRN: [id number]  Encounter Provider: Sage Jack MD  Encounter Date: 2024   Encounter department: [facility] [address]    Assessment & Plan  Therapeutic opioid induced constipation  Reports weekly bowel movements, on Suboxone, will start senna, return to office in 2 weeks for monitoring  Orders:    senna (SENOKOT) 8.6 mg; Take 1 tablet (8.6 mg total) by mouth daily at bedtime    Bipolar 1 disorder (HCC)  Reports history of bipolar, with distant history of hospitalization for krystin and seeing dark shadows, although he reports significant polysubstance abuse at that time.  Orders:    Ambulatory referral to Psych Services; Future    Ambulatory Referral to Social Work Care Management Program; Future    Class 1 obesity with body mass index (BMI) of 31.0 to 31.9 in adult, unspecified obesity type, unspecified whether serious comorbidity present  Counseled on diet and exercise, obesity handout reviewed and provided  Orders:    Ambulatory Referral to Nutrition Services; Future    Ingram syndrome  Counseling provided, patient reports having GI appointment for follow-up and overdue colonoscopy.  Encourage clean, high-fiber diet, nutritional referral for colon cancer friendly diet, will defer further management and monitoring to gastroenterology.  Orders:    Ambulatory Referral to Nutrition Services; Future    Depression, unspecified depression type  Depression Screening Follow-up Plan: Patient's depression screening was positive with a PHQ-2 score of 4. Their PHQ-9 score was 20. Patient assessed for underlying major depression. They have no active suicidal ideations. Brief counseling provided and recommend additional follow-up/re-evaluation next office visit.      Resuming Wellbutrin which patient reports a positive response to it in the past without inducing krystin.  Psychiatry referral placed.  Patient hesitant to  reestablish with psychiatry.  Advised patient on the importance of seeing psychiatry given his multiple diagnoses including bipolar 1, history of polysubstance abuse including opioid dependence, and depressive symptoms.      Orders:    buPROPion (WELLBUTRIN XL) 150 mg 24 hr tablet; Take 1 tablet (150 mg total) by mouth every morning for 4 days, THEN 2 tablets (300 mg total) every morning.    Routine check-up  Per note from last PCP: Due for physical, needs Tdap/flu, labs drawn yesterday (pending)  -History of bipolar disorder, ischemic colitis with bowel resection in 2009, Ignram syndrome, past substance abuse sober since 2012.  On Suboxone 0.8 mg film (Dr. Kalyani Craig), surgical bowel resection in 2009 [facility], multiple hospitalizations for sepsis from [id number]    -Return to office or follow-up in 2 weeks          History of Present Illness     HPI  Patient is a 28-year-old male with a past medical history of Ingram syndrome, colon resection, bipolar type I, and distant opioid abuse who presents to establish care.  With regards to his Ingram syndrome, he reports a family history of cancer, being diagnosed by genetic testing, he reports undergoing a colon resection at age 28 and being told that he needs a colonoscopy every 6 months, but has not followed up with GI since.  He denies any blood in his stools, but reports narrow caliber stools and symptoms are chronic constipation with bowel movements about every 7 days.  Of note he is on Suboxone and is not on any constipation medication.  He reports being free from illicit drug use for many years.  He reports a history of bipolar with a distant history of hospitalization for manic episode many years ago, he reports polysubstance abuse at that time.  He denies any manic episodes for years since being clean from drug use.  He has worked with psychiatrist in the past but not recently.  He does report some depressive symptoms and improvement of his symptoms and recent years  "with bupropion 450 mg daily, he denies any manic episodes with that treatment.  He is interested in restarting bupropion today.  He denies any suicidal ideation.  He is also concerned about his weight gain and is interested in losing weight.  He had labs drawn yesterday which have not resulted yet.    History obtained from : patient  Review of Systems   Constitutional:  Negative for chills and fever.   Respiratory:  Negative for shortness of breath.    Cardiovascular:  Negative for chest pain.   Gastrointestinal:  Positive for constipation. Negative for blood in stool, diarrhea, nausea and vomiting.        Narrow caliber stool   Neurological:  Negative for dizziness and headaches.   Psychiatric/Behavioral:  Positive for dysphoric mood. Negative for confusion, hallucinations and suicidal ideas.            Objective     Ht 5' 6\" (1.676 m)   Wt 88.1 kg (194 lb 3.2 oz)   BMI 31.34 kg/m²     Physical Exam  Vitals and nursing note reviewed.   Constitutional:       General: He is not in acute distress.     Appearance: He is well-developed.   HENT:      Head: Normocephalic and atraumatic.   Eyes:      Conjunctiva/sclera: Conjunctivae normal.   Cardiovascular:      Rate and Rhythm: Normal rate and regular rhythm.      Heart sounds: No murmur heard.  Pulmonary:      Effort: Pulmonary effort is normal. No respiratory distress.      Breath sounds: Normal breath sounds.   Musculoskeletal:      Right lower leg: No edema.      Left lower leg: No edema.   Neurological:      Mental Status: He is alert.         "

## 2024-09-18 ENCOUNTER — PATIENT OUTREACH (OUTPATIENT)
Dept: CASE MANAGEMENT | Facility: OTHER | Age: 49
End: 2024-09-18

## 2024-09-18 ENCOUNTER — TELEPHONE (OUTPATIENT)
Age: 49
End: 2024-09-18

## 2024-09-18 NOTE — TELEPHONE ENCOUNTER
The writer attempted to contact the patient to verify the need for services. The writer LVM for the patient to contact the intake department for assistance with scheduling.     1st attempt

## 2024-09-18 NOTE — PROGRESS NOTES
SILVIA WEBB received a referral from patient's PCP as patient would like to establish with a psychiatrist. SILVIA WEBB reviewed patient's chart and called patient ([phone]). Patient answered and stated he is not interested in establishing with a psychiatrist. He stated that his wife wanted a referral to a  as he has Ingram Syndrome but does not have any current needs at this time. He did confirm he is meeting with a therapist. Patient stated that if he has any additional needs, he will outreach SILVIA WEBB as needed.

## 2024-09-19 NOTE — TELEPHONE ENCOUNTER
The writer attempted to contact the patient to verify the need for services. The writer LVM for the patient to contact the intake department for assistance with scheduling.     2nd attempt

## 2024-09-23 ENCOUNTER — TELEPHONE (OUTPATIENT)
Dept: GASTROENTEROLOGY | Facility: CLINIC | Age: 49
End: 2024-09-23

## 2024-09-23 ENCOUNTER — OFFICE VISIT (OUTPATIENT)
Dept: GASTROENTEROLOGY | Facility: CLINIC | Age: 49
End: 2024-09-23
Payer: COMMERCIAL

## 2024-09-23 ENCOUNTER — TELEPHONE (OUTPATIENT)
Dept: FAMILY MEDICINE CLINIC | Facility: HOSPITAL | Age: 49
End: 2024-09-23

## 2024-09-23 VITALS
DIASTOLIC BLOOD PRESSURE: 80 MMHG | BODY MASS INDEX: 31.5 KG/M2 | WEIGHT: 196 LBS | SYSTOLIC BLOOD PRESSURE: 118 MMHG | HEIGHT: 66 IN

## 2024-09-23 DIAGNOSIS — Z15.09 LYNCH SYNDROME: ICD-10-CM

## 2024-09-23 DIAGNOSIS — Z86.0100 PERSONAL HISTORY OF COLONIC POLYPS: ICD-10-CM

## 2024-09-23 DIAGNOSIS — T40.2X5A THERAPEUTIC OPIOID INDUCED CONSTIPATION: Primary | ICD-10-CM

## 2024-09-23 DIAGNOSIS — Z86.010 PERSONAL HISTORY OF COLONIC POLYPS: ICD-10-CM

## 2024-09-23 DIAGNOSIS — K59.03 THERAPEUTIC OPIOID INDUCED CONSTIPATION: Primary | ICD-10-CM

## 2024-09-23 DIAGNOSIS — R79.89 LOW TESTOSTERONE IN MALE: Primary | ICD-10-CM

## 2024-09-23 PROCEDURE — 99244 OFF/OP CNSLTJ NEW/EST MOD 40: CPT | Performed by: INTERNAL MEDICINE

## 2024-09-23 RX ORDER — POLYETHYLENE GLYCOL 3350, SODIUM CHLORIDE, SODIUM BICARBONATE, POTASSIUM CHLORIDE 420; 11.2; 5.72; 1.48 G/4L; G/4L; G/4L; G/4L
4000 POWDER, FOR SOLUTION ORAL ONCE
Qty: 4000 ML | Refills: 0 | Status: SHIPPED | OUTPATIENT
Start: 2024-09-23 | End: 2024-09-23

## 2024-09-23 NOTE — H&P (VIEW-ONLY)
[facility] - Outpatient Consultation  Salazar Lindsay 49 y.o. male MRN: [id number]  Encounter: [id number]    ASSESSMENT AND PLAN:      1. Therapeutic opioid induced constipation  He can go up to 7 days in between bowel movements, and senna has not done anything for him.  I have prescribed Linzess with the hope that this helps, especially since he is due for colonoscopy and needs to be prepped.  I did discuss Relastor if needed  - linaCLOtide 290 MCG CAPS; Take 1 capsule by mouth in the morning  Dispense: 30 capsule; Refill: 5  - polyethylene glycol-electrolytes (NULYTELY) 4000 mL solution; Take 4,000 mL by mouth once for 1 dose  Dispense: 4000 mL; Refill: 0    2. Personal history of colonic polyps  He is way overdue for colonoscopy.  I will proceed with 1 now.  Procedure risks and preparation discussed in detail    3. Ingram syndrome  Since he has Ingram, he needs a colonoscopy every year and an upper endoscopy with side-viewing scope every other year, which we we will proceed with now.  I will schedule in 1 hour time block knowing that he may have lots of polyps and arrange to have a side-viewing scope at the hospital  - Colonoscopy; Future  - EGD; Future  - polyethylene glycol-electrolytes (NULYTELY) 4000 mL solution; Take 4,000 mL by mouth once for 1 dose  Dispense: 4000 mL; Refill: 0      Follow up Appointment: For upper endoscopy and colonoscopy    _______________________      Chief Complaint   Patient presents with    Hx of Ingram syndrome, constipation, abd. cramping, bloating     Referred by Dr. Sage Villafuerte       HPI:   Salazar Lindsay is a 49 y.o. year old male with a PMH significant for Ingram syndrome and a personal history of colon polyps who presents from a consultation from PCP for discussion about screening exams for Ingram.  Unfortunately he lost his insurance and because of the pandemic, his last colonoscopy was in 2017.  His main complaint is a change in bowel  "habits with severe constipation, moving his bowels only once every 7 days.  Even when he does move his bowels, he does not feel completely evacuated.  He does not notice blood in the stool, but has noticed a thinning of his stool caliber.  He denies any upper symptoms.  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)     heroin, and alcohol    Ingram syndrome      Past Surgical History:   Procedure Laterality Date    SMALL INTESTINE SURGERY       Social History     Substance and Sexual Activity   Alcohol Use No     Social History     Substance and Sexual Activity   Drug Use No     Social History     Tobacco Use   Smoking Status Never   Smokeless Tobacco Current    Types: Chew     Family History   Problem Relation Age of Onset    Colon polyps Mother     Colon cancer Mother     Ingram Syndrome Mother     Ovarian cancer Mother     Other Mother         Ingram Syndrome    Other Sister         Ingram Syndrome    Ingram Syndrome Maternal Grandmother     Colon polyps Maternal Grandfather     Colon cancer Maternal Grandfather     Ingram Syndrome Paternal Grandmother     Other Maternal Aunt         Ingram Syndrome    Other Maternal Uncle         Ingram Syndrome       Meds/Allergies     Current Outpatient Medications:     buprenorphine-naloxone (SUBOXONE) 8-2 mg per SL tablet    buPROPion (WELLBUTRIN XL) 150 mg 24 hr tablet    ibuprofen (MOTRIN) 600 mg tablet    linaCLOtide 290 MCG CAPS    polyethylene glycol-electrolytes (NULYTELY) 4000 mL solution    senna (SENOKOT) 8.6 mg    Allergies   Allergen Reactions    Contrast [Iodinated Contrast Media] Anaphylaxis    Sulfamethoxazole-Trimethoprim Vomiting       PHYSICAL EXAM:    Blood pressure 118/80, height 5' 6\" (1.676 m), weight 88.9 kg (196 lb). Body mass index is 31.64 kg/m².  General Appearance: " "NAD, cooperative, alert  Eyes: Anicteric  GI:  Soft, non-tender, non-distended; normal bowel sounds; no masses, no organomegaly   Rectal: Deferred  Musculoskeletal: No edema.  Skin:  No jaundice    Lab Results:   Lab Results   Component Value Date    WBC 7.49 11/23/2016    HGB 14.3 11/23/2016    MCV 92 11/23/2016     11/23/2016     Lab Results   Component Value Date    K 3.7 11/23/2016     11/23/2016    CO2 29 11/23/2016    BUN 13 11/23/2016    CREATININE 1.02 11/23/2016    CALCIUM 8.5 11/23/2016    EGFR >60.0 11/23/2016     No results found for: \"IRON\", \"TIBC\", \"FERRITIN\"  No results found for: \"LIPASE\"    Radiology Results:   No results found.  "

## 2024-09-23 NOTE — TELEPHONE ENCOUNTER
Scheduled date of combo (as of today):  10-01-24  Physician performing combo:Tadeo  Location of combo:SLUB  Bowel prep reviewed with patient:Ro  Instructions reviewed with patient by:pq  Clearances: no  need 1 hr  and ERCP  scope per Tadeo PQ

## 2024-09-23 NOTE — TELEPHONE ENCOUNTER
----- Message from Sage Villafuerte MD sent at 9/23/2024 10:47 AM EDT -----  Regarding: Please schedule for follow-up, ideally in the next 2 weeks  To review blood work, low testosterone, prediabetes, high cholesterol.  Thanks

## 2024-09-23 NOTE — PROGRESS NOTES
[facility] - Outpatient Consultation  Salazar Lindsay 49 y.o. male MRN: [id number]  Encounter: [id number]    ASSESSMENT AND PLAN:      1. Therapeutic opioid induced constipation  He can go up to 7 days in between bowel movements, and senna has not done anything for him.  I have prescribed Linzess with the hope that this helps, especially since he is due for colonoscopy and needs to be prepped.  I did discuss Relastor if needed  - linaCLOtide 290 MCG CAPS; Take 1 capsule by mouth in the morning  Dispense: 30 capsule; Refill: 5  - polyethylene glycol-electrolytes (NULYTELY) 4000 mL solution; Take 4,000 mL by mouth once for 1 dose  Dispense: 4000 mL; Refill: 0    2. Personal history of colonic polyps  He is way overdue for colonoscopy.  I will proceed with 1 now.  Procedure risks and preparation discussed in detail    3. Ingram syndrome  Since he has Ingram, he needs a colonoscopy every year and an upper endoscopy with side-viewing scope every other year, which we we will proceed with now.  I will schedule in 1 hour time block knowing that he may have lots of polyps and arrange to have a side-viewing scope at the hospital  - Colonoscopy; Future  - EGD; Future  - polyethylene glycol-electrolytes (NULYTELY) 4000 mL solution; Take 4,000 mL by mouth once for 1 dose  Dispense: 4000 mL; Refill: 0      Follow up Appointment: For upper endoscopy and colonoscopy    _______________________      Chief Complaint   Patient presents with    Hx of Ingram syndrome, constipation, abd. cramping, bloating     Referred by Dr. Sage Villafuerte       HPI:   Salazar Lindsay is a 49 y.o. year old male with a PMH significant for Ingram syndrome and a personal history of colon polyps who presents from a consultation from PCP for discussion about screening exams for Ingram.  Unfortunately he lost his insurance and because of the pandemic, his last colonoscopy was in 2017.  His main complaint is a change in bowel  "habits with severe constipation, moving his bowels only once every 7 days.  Even when he does move his bowels, he does not feel completely evacuated.  He does not notice blood in the stool, but has noticed a thinning of his stool caliber.  He denies any upper symptoms.  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)     heroin, and alcohol    Ingram syndrome      Past Surgical History:   Procedure Laterality Date    SMALL INTESTINE SURGERY       Social History     Substance and Sexual Activity   Alcohol Use No     Social History     Substance and Sexual Activity   Drug Use No     Social History     Tobacco Use   Smoking Status Never   Smokeless Tobacco Current    Types: Chew     Family History   Problem Relation Age of Onset    Colon polyps Mother     Colon cancer Mother     Ingram Syndrome Mother     Ovarian cancer Mother     Other Mother         Ingram Syndrome    Other Sister         Ingram Syndrome    Ingram Syndrome Maternal Grandmother     Colon polyps Maternal Grandfather     Colon cancer Maternal Grandfather     Ingram Syndrome Paternal Grandmother     Other Maternal Aunt         Ingram Syndrome    Other Maternal Uncle         Ingram Syndrome       Meds/Allergies     Current Outpatient Medications:     buprenorphine-naloxone (SUBOXONE) 8-2 mg per SL tablet    buPROPion (WELLBUTRIN XL) 150 mg 24 hr tablet    ibuprofen (MOTRIN) 600 mg tablet    linaCLOtide 290 MCG CAPS    polyethylene glycol-electrolytes (NULYTELY) 4000 mL solution    senna (SENOKOT) 8.6 mg    Allergies   Allergen Reactions    Contrast [Iodinated Contrast Media] Anaphylaxis    Sulfamethoxazole-Trimethoprim Vomiting       PHYSICAL EXAM:    Blood pressure 118/80, height 5' 6\" (1.676 m), weight 88.9 kg (196 lb). Body mass index is 31.64 kg/m².  General Appearance: " "NAD, cooperative, alert  Eyes: Anicteric  GI:  Soft, non-tender, non-distended; normal bowel sounds; no masses, no organomegaly   Rectal: Deferred  Musculoskeletal: No edema.  Skin:  No jaundice    Lab Results:   Lab Results   Component Value Date    WBC 7.49 11/23/2016    HGB 14.3 11/23/2016    MCV 92 11/23/2016     11/23/2016     Lab Results   Component Value Date    K 3.7 11/23/2016     11/23/2016    CO2 29 11/23/2016    BUN 13 11/23/2016    CREATININE 1.02 11/23/2016    CALCIUM 8.5 11/23/2016    EGFR >60.0 11/23/2016     No results found for: \"IRON\", \"TIBC\", \"FERRITIN\"  No results found for: \"LIPASE\"    Radiology Results:   No results found.  "

## 2024-09-24 ENCOUNTER — TELEPHONE (OUTPATIENT)
Age: 49
End: 2024-09-24

## 2024-09-24 NOTE — TELEPHONE ENCOUNTER
"Pt. Returned phone call... pt called and was in shock that a referral for Psychiatry \"worked a long time to get away from this\" Referral Closed.  "

## 2024-09-24 NOTE — LETTER
[facility]  [address]  [place] [address]  [phone]  Dept: [phone]    September 25, 2024     Patient: Salazar Lindsay   YOB: 1975   Date of Visit:    Member ID [id number]     To Whom it May Concern:    Salazar Lindsay is under my professional care. Please consider this letter as an appeal for the medication   linaCLOtide 290 MCG CAPS to be taken daily.  The Associated Diagnoses is: Chronic idiopathic constipation [K59.04]  This patient can go up to 7 days in between bowel movements.  He is prescribed Linzess with the hop this helps especially since he is due for a colonoscopy and needs to be prepped.  Due to the patient's severity of constipation it is clinically acceptable to start with 290 mcg and taper if needed.  He tried and failed with ineffective results all over the counter laxatives, stool softeners and fiber therapy such as Sorbitol, Miralax, Metamucil, senna, bisacodyl, milk of magnesia, psyllium, docusate, fiber therapy, glycerin, bisacodly suppositories, magnesium citrate, probiotics, polyethylene glycol, dulcolax.  His main complaint is a change in bowel habits with severe constipation, moving his bowels only once every 7 days. Even when he does move his bowels, he does not feel completely evacuated. He does not notice blood in the stool, but has noticed a thinning of his stool caliber. He denies any upper symptoms. He has been on Suboxone for years.    Please fax new determination urgently to [phone].  This medication is appropriate for the patient's age and diagnosis. Thank you.      If you have any questions or concerns, please don't hesitate to call.         Sincerely,        Ez Piedra, DO  [address] [facility] [address]  Phone: [phone]   Fax: [phone]  COMFORT #: [id number]   NPI: [id number]                          Medication  linaCLOtide 290 MCG CAPS [[id number]]  linaCLOtide 290 MCG CAPS  [[id number]]    Order Details  Dose: 290 mcg Route: Oral Frequency: Daily   Dispense Quantity: 30 capsule Refills: 3          Sig: Take 1 capsule by mouth in the morning         Start Date: 09/25/24 End Date: --   Written Date: 09/25/24 Expiration Date: 09/25/25       Associated Diagnoses: Chronic idiopathic constipation [K59.04]   Providers    Authorizing Provider:  Ez Piedra DO  [address] [facility] [address]  Phone: [phone]   Fax: [phone]  COMFORT #: [id number]   NPI: [id number]        Ordering User: Ez Piedra DO          Pharmacy    [facility] - [place], [place] - [address] [place] [address]  [address] [facility] [address]  Phone: [phone]  Fax: [phone]  COMFORT #: --

## 2024-09-24 NOTE — TELEPHONE ENCOUNTER
PA for Linzess 290 mcg SUBMITTED     via    []CMM-KEY:   [x]Abraham-Case ID # [id number]:[employer] ePA Off RampPhone:[phone]   []Faxed to plan   []Other website   []Phone call Case ID #     Office notes sent, clinical questions answered. Awaiting determination    Turnaround time for your insurance to make a decision on your Prior Authorization can take 7-21 business days.

## 2024-09-24 NOTE — TELEPHONE ENCOUNTER
The writer attempted to contact the patient to verify the need for services. The writer LVM for the patient to contact the intake department for assistance with scheduling.     Third attempt. Referral closed.

## 2024-09-24 NOTE — TELEPHONE ENCOUNTER
Patient calling as he spoke with the insurance company. Insurance company told him they will be sending a fax to the office but that this could be expedited if someone reaches out to them directly. Diagnosis needs to be written a certain way for the authorization. Patient is requesting a call with an update once this is completed.

## 2024-09-24 NOTE — TELEPHONE ENCOUNTER
Pt's wife calling.  Has questions about the prior auth process for medications.  Explained that we have a team that works on the IBD meds and prior auths.  Prior auth was started today for pt's Linaclotide.  May take 7-21 days.  Pt's wife verbalizes understanding.

## 2024-09-24 NOTE — TELEPHONE ENCOUNTER
The quickest way for the PA to be done is through CoverMyMeds of which was already submitted earlier today.  Waiting for determination  The diagnosis used is what is in pt' script.  Therapeutic opioid induced constipation

## 2024-09-24 NOTE — TELEPHONE ENCOUNTER
PA for Linzess 290 mcg SUBMITTED     via    [x][facility]-KEY: [id number]  []Surescripts-Case ID #   []Faxed to plan   []Other website   []Phone call Case ID #     Office notes sent, clinical questions answered. Awaiting determination    Turnaround time for your insurance to make a decision on your Prior Authorization can take 7-21 business days.

## 2024-09-25 DIAGNOSIS — K59.04 CHRONIC IDIOPATHIC CONSTIPATION: Primary | ICD-10-CM

## 2024-09-25 DIAGNOSIS — K59.04 CHRONIC IDIOPATHIC CONSTIPATION: ICD-10-CM

## 2024-09-25 NOTE — TELEPHONE ENCOUNTER
Pt called stating [facility] pharmacy is out of stock of this medication and would need to order it, please resend to [facility] in [place] in chart.

## 2024-09-25 NOTE — TELEPHONE ENCOUNTER
PA for Linzess 290 mcg APPEALED via     []CMM  []SS  [x]Letter sent to insurance via fax [phone]  []Other site or means     All necessary records sent. Will await response from insurance company    Turnaround time for a decision to be made on an appeal could take up to 30 business days

## 2024-09-25 NOTE — TELEPHONE ENCOUNTER
[facility] Transitions Initial Follow Up Call    Outreach made within 2 business days of discharge: Yes    Patient: Sherry Martinez Patient : 1961   MRN: [id number]  Reason for Admission: There are no discharge diagnoses documented for the most recent discharge. Discharge Date: 21       Spoke with: [facility] for pt to return our call at the earliest convenience. Patient just got off phone with insurance company and they are saying that they have received nothing and that last information received was yesterday 9/24/24.  We received message from insurance today stating they are working on it.    Patient is extremely frustrated.  Patient wanting to know if Linzess does not work, is there another option. Patient requesting Prior Auth department to give him a call.    CB:  [phone]

## 2024-09-25 NOTE — TELEPHONE ENCOUNTER
PA for Linzess 290 mcg  DENIED    Reason:(Screenshot if applicable)        Note to GI office:  DX need to be for chronic idiopathic constipation or IBS with constipation... an appeal letter from the Provider will be needed .  Please advise the PA team.  Thank you     Message sent to office clinical pool Yes    Denial letter scanned into Media Yes    Appeal started No (Provider will need to decide if appeal is warranted and send clinical documentation to Prior Authorization Team for initiation.)    **Please follow up with your patient regarding denial and next steps**

## 2024-09-25 NOTE — TELEPHONE ENCOUNTER
Called insurance   Transferred to [facility]  ([phone])   Did peer to peer   Approval for 12 months , all dosage will be approved with in that time fram  PA number:  [id number]     PA for Linzess 290 mcg  APPROVED     Date(s) approved for 12 months    Case #[id number]     Patient advised by          []MyChart Message  [x]Phone call pt voiced, very happy medication is approved.  Was in the middle of something so I advised pt I will quickly MyChart the approval information  []LMOM  []L/M to call office as no active Communication consent on file  []Unable to leave detailed message as VM not approved on Communication consent       Pharmacy advised by    [x]Fax  []Phone call    Approval letter NOT scanned into Media No recvd at time of approval on phone

## 2024-09-25 NOTE — TELEPHONE ENCOUNTER
Pt made aware that appeal for Linzess 290 mcg daily has been initiated with diagnoses of chronic idiopathic constipation. Pt reports [facility] does not have this medication in stock. Pt confirms that [facility] has above medication in stock and requests script to be sent there to be filled. Pt aware prior authorization is in process and that [facility] may call to inform him of this.      [id number]  [address]  [place] [address]  [phone]

## 2024-09-27 DIAGNOSIS — R79.89 LOW TESTOSTERONE IN MALE: Primary | ICD-10-CM

## 2024-10-01 ENCOUNTER — ANESTHESIA EVENT (OUTPATIENT)
Dept: GASTROENTEROLOGY | Facility: HOSPITAL | Age: 49
End: 2024-10-01
Payer: COMMERCIAL

## 2024-10-01 ENCOUNTER — HOSPITAL ENCOUNTER (OUTPATIENT)
Dept: GASTROENTEROLOGY | Facility: HOSPITAL | Age: 49
Setting detail: OUTPATIENT SURGERY
Discharge: HOME | End: 2024-10-01
Attending: INTERNAL MEDICINE
Payer: COMMERCIAL

## 2024-10-01 ENCOUNTER — ANESTHESIA (OUTPATIENT)
Dept: GASTROENTEROLOGY | Facility: HOSPITAL | Age: 49
End: 2024-10-01
Payer: COMMERCIAL

## 2024-10-01 VITALS
SYSTOLIC BLOOD PRESSURE: 113 MMHG | OXYGEN SATURATION: 96 % | TEMPERATURE: 97.5 F | BODY MASS INDEX: 31.5 KG/M2 | DIASTOLIC BLOOD PRESSURE: 68 MMHG | RESPIRATION RATE: 16 BRPM | WEIGHT: 196 LBS | HEIGHT: 66 IN | HEART RATE: 79 BPM

## 2024-10-01 DIAGNOSIS — Z15.09 LYNCH SYNDROME: ICD-10-CM

## 2024-10-01 DIAGNOSIS — F19.21 DRUG ADDICTION IN REMISSION (HCC): Primary | ICD-10-CM

## 2024-10-01 PROCEDURE — 43235 EGD DIAGNOSTIC BRUSH WASH: CPT | Performed by: INTERNAL MEDICINE

## 2024-10-01 PROCEDURE — G0105 COLORECTAL SCRN; HI RISK IND: HCPCS | Performed by: INTERNAL MEDICINE

## 2024-10-01 RX ORDER — LIDOCAINE HYDROCHLORIDE 20 MG/ML
INJECTION, SOLUTION EPIDURAL; INFILTRATION; INTRACAUDAL; PERINEURAL AS NEEDED
Status: DISCONTINUED | OUTPATIENT
Start: 2024-10-01 | End: 2024-10-01

## 2024-10-01 RX ORDER — PROPOFOL 10 MG/ML
INJECTION, EMULSION INTRAVENOUS AS NEEDED
Status: DISCONTINUED | OUTPATIENT
Start: 2024-10-01 | End: 2024-10-01

## 2024-10-01 RX ORDER — SODIUM CHLORIDE 9 MG/ML
INJECTION, SOLUTION INTRAVENOUS CONTINUOUS PRN
Status: DISCONTINUED | OUTPATIENT
Start: 2024-10-01 | End: 2024-10-01

## 2024-10-01 RX ADMIN — PROPOFOL 50 MG: 10 INJECTION, EMULSION INTRAVENOUS at 13:40

## 2024-10-01 RX ADMIN — LIDOCAINE HYDROCHLORIDE 100 MG: 20 INJECTION, SOLUTION EPIDURAL; INFILTRATION; INTRACAUDAL; PERINEURAL at 13:21

## 2024-10-01 RX ADMIN — PROPOFOL 50 MG: 10 INJECTION, EMULSION INTRAVENOUS at 13:37

## 2024-10-01 RX ADMIN — PROPOFOL 100 MG: 10 INJECTION, EMULSION INTRAVENOUS at 13:21

## 2024-10-01 RX ADMIN — PROPOFOL 50 MG: 10 INJECTION, EMULSION INTRAVENOUS at 13:32

## 2024-10-01 RX ADMIN — PROPOFOL 100 MG: 10 INJECTION, EMULSION INTRAVENOUS at 13:22

## 2024-10-01 RX ADMIN — PROPOFOL 50 MG: 10 INJECTION, EMULSION INTRAVENOUS at 13:30

## 2024-10-01 RX ADMIN — PROPOFOL 50 MG: 10 INJECTION, EMULSION INTRAVENOUS at 13:26

## 2024-10-01 RX ADMIN — PROPOFOL 30 MG: 10 INJECTION, EMULSION INTRAVENOUS at 13:45

## 2024-10-01 RX ADMIN — PROPOFOL 50 MG: 10 INJECTION, EMULSION INTRAVENOUS at 13:28

## 2024-10-01 RX ADMIN — PROPOFOL 50 MG: 10 INJECTION, EMULSION INTRAVENOUS at 13:35

## 2024-10-01 RX ADMIN — SODIUM CHLORIDE: 0.9 INJECTION, SOLUTION INTRAVENOUS at 13:10

## 2024-10-01 NOTE — INTERVAL H&P NOTE
H&P reviewed. After examining the patient I find no changes in the patients condition since the H&P had been written.    Vitals:    10/01/24 1243   BP: 162/88   Pulse: 77   Resp: 18   Temp: 97.5 °F (36.4 °C)   SpO2: 98%

## 2024-10-01 NOTE — ANESTHESIA PREPROCEDURE EVALUATION
Procedure:  COLONOSCOPY  EGD    Relevant Problems   Behavioral Health   (+) Alcohol abuse   (+) Bipolar 1 disorder (HCC)      Other   (+) Drug addiction in remission (HCC)        Physical Exam    Airway    Mallampati score: III  TM Distance: >3 FB  Neck ROM: full     Dental   No notable dental hx     Cardiovascular      Pulmonary      Other Findings        Anesthesia Plan  ASA Score- 2     Anesthesia Type- IV sedation with anesthesia with ASA Monitors.         Additional Monitors:     Airway Plan:            Plan Factors-    Chart reviewed.    Patient summary reviewed.    Patient is not a current smoker.              Induction- intravenous.    Postoperative Plan-         Informed Consent- Anesthetic plan and risks discussed with patient.  I personally reviewed this patient with the CRNA. Discussed and agreed on the Anesthesia Plan with the CRNA..

## 2024-10-01 NOTE — ANESTHESIA POSTPROCEDURE EVALUATION
Post-Op Assessment Note    CV Status:  Stable  Pain Score: 0    Pain management: adequate       Mental Status:  Alert and awake   Hydration Status:  Euvolemic   PONV Controlled:  Controlled   Airway Patency:  Patent     Post Op Vitals Reviewed: Yes    No anethesia notable event occurred.    Staff: CRNA               BP   113/70   Temp   98.1   Pulse  77   Resp   18   SpO2   97

## 2024-10-03 ENCOUNTER — TELEPHONE (OUTPATIENT)
Age: 49
End: 2024-10-03

## 2024-10-03 DIAGNOSIS — F32.A DEPRESSION, UNSPECIFIED DEPRESSION TYPE: ICD-10-CM

## 2024-10-03 RX ORDER — BUPROPION HYDROCHLORIDE 300 MG/1
300 TABLET ORAL EVERY MORNING
Qty: 90 TABLET | Refills: 0 | Status: SHIPPED | OUTPATIENT
Start: 2024-10-03 | End: 2025-09-28

## 2024-10-03 NOTE — TELEPHONE ENCOUNTER
Pt called stating that his script for buPROPion (WELLBUTRIN XL) 150 mg 24 hr tablet was not written correctly. He said its needs to be written in a certain way for insurance.  He said it should be written as 2 tabs 150 mg a day. He states to [facility] to get exact verbiage.     Pt can be reached at [phone]

## 2024-10-08 ENCOUNTER — TELEPHONE (OUTPATIENT)
Dept: FAMILY MEDICINE CLINIC | Facility: HOSPITAL | Age: 49
End: 2024-10-08

## 2024-10-08 DIAGNOSIS — R79.89 LOW TESTOSTERONE IN MALE: Primary | ICD-10-CM

## 2024-10-08 LAB
TESTOST FREE SERPL-MCNC: 5.6 PG/ML (ref 7.2–24)
TESTOST SERPL-MCNC: 173 NG/DL (ref 264–916)

## 2024-10-09 ENCOUNTER — TELEPHONE (OUTPATIENT)
Dept: FAMILY MEDICINE CLINIC | Facility: HOSPITAL | Age: 49
End: 2024-10-09

## 2024-10-17 ENCOUNTER — OFFICE VISIT (OUTPATIENT)
Dept: FAMILY MEDICINE CLINIC | Facility: HOSPITAL | Age: 49
End: 2024-10-17
Payer: COMMERCIAL

## 2024-10-17 VITALS
TEMPERATURE: 97.1 F | DIASTOLIC BLOOD PRESSURE: 80 MMHG | WEIGHT: 192 LBS | BODY MASS INDEX: 30.99 KG/M2 | HEART RATE: 77 BPM | SYSTOLIC BLOOD PRESSURE: 128 MMHG | OXYGEN SATURATION: 99 %

## 2024-10-17 DIAGNOSIS — R79.89 LOW TESTOSTERONE IN MALE: ICD-10-CM

## 2024-10-17 DIAGNOSIS — Z11.59 NEED FOR HEPATITIS C SCREENING TEST: ICD-10-CM

## 2024-10-17 DIAGNOSIS — E78.5 DYSLIPIDEMIA: ICD-10-CM

## 2024-10-17 DIAGNOSIS — E66.811 CLASS 1 OBESITY DUE TO EXCESS CALORIES WITH SERIOUS COMORBIDITY AND BODY MASS INDEX (BMI) OF 30.0 TO 30.9 IN ADULT: ICD-10-CM

## 2024-10-17 DIAGNOSIS — Z23 NEED FOR INFLUENZA VACCINATION: Primary | ICD-10-CM

## 2024-10-17 DIAGNOSIS — E66.09 CLASS 1 OBESITY DUE TO EXCESS CALORIES WITH SERIOUS COMORBIDITY AND BODY MASS INDEX (BMI) OF 30.0 TO 30.9 IN ADULT: ICD-10-CM

## 2024-10-17 DIAGNOSIS — R53.83 FATIGUE, UNSPECIFIED TYPE: ICD-10-CM

## 2024-10-17 DIAGNOSIS — G43.909 MIGRAINE WITHOUT STATUS MIGRAINOSUS, NOT INTRACTABLE, UNSPECIFIED MIGRAINE TYPE: ICD-10-CM

## 2024-10-17 DIAGNOSIS — K59.03 OPIOID-INDUCED CONSTIPATION: ICD-10-CM

## 2024-10-17 DIAGNOSIS — R73.03 PREDIABETES: ICD-10-CM

## 2024-10-17 DIAGNOSIS — T40.2X5A OPIOID-INDUCED CONSTIPATION: ICD-10-CM

## 2024-10-17 DIAGNOSIS — Z11.4 SCREENING FOR HIV (HUMAN IMMUNODEFICIENCY VIRUS): ICD-10-CM

## 2024-10-17 PROCEDURE — 99214 OFFICE O/P EST MOD 30 MIN: CPT

## 2024-10-17 PROCEDURE — 90471 IMMUNIZATION ADMIN: CPT

## 2024-10-17 PROCEDURE — 90673 RIV3 VACCINE NO PRESERV IM: CPT

## 2024-10-17 RX ORDER — SUMATRIPTAN 50 MG/1
50 TABLET, FILM COATED ORAL ONCE AS NEEDED
Qty: 10 TABLET | Refills: 1 | Status: SHIPPED | OUTPATIENT
Start: 2024-10-17

## 2024-10-17 RX ORDER — POLYETHYLENE GLYCOL 3350 17 G/17G
17 POWDER, FOR SOLUTION ORAL DAILY PRN
Start: 2024-10-17

## 2024-10-17 RX ORDER — SENNOSIDES 8.6 MG
8.6 TABLET ORAL
Start: 2024-10-17

## 2024-10-17 RX ORDER — SEMAGLUTIDE 0.25 MG/.5ML
INJECTION, SOLUTION SUBCUTANEOUS
Qty: 2 ML | Refills: 0 | Status: SHIPPED | OUTPATIENT
Start: 2024-10-17 | End: 2024-10-17

## 2024-10-17 RX ORDER — SEMAGLUTIDE 0.25 MG/.5ML
INJECTION, SOLUTION SUBCUTANEOUS
Qty: 2 ML | Refills: 0 | Status: SHIPPED | OUTPATIENT
Start: 2024-10-17

## 2024-10-17 NOTE — ASSESSMENT & PLAN NOTE
Recent A1c 5.7, counseled on lifestyle changes including diet, exercise and weight loss  Orders:    Semaglutide-Weight Management (Wegovy) 0.25 MG/0.5ML; Inject 0.25 mg under the skin weekly

## 2024-10-17 NOTE — PROGRESS NOTES
Ambulatory Visit  Name: Salazar Lindsay      : 1975      MRN: [id number]  Encounter Provider: Sage Jack MD  Encounter Date: 10/17/2024   Encounter department: [facility] CARE [address]    Assessment & Plan  Need for influenza vaccination    Orders:    influenza vaccine, recombinant, PF, 0.5 mL IM (Flublok)    Prediabetes  Recent A1c 5.7, counseled on lifestyle changes including diet, exercise and weight loss  Orders:    Semaglutide-Weight Management (Wegovy) 0.25 MG/0.5ML; Inject 0.25 mg under the skin weekly    Low testosterone in male  Patient reports chronic fatigue and being on testosterone replacement in the past.  Recent lab work showed low testosterone x 2.    -Referral for endocrinology (already ordered)  -Lab workup with CBC, LH, FSH, and PSA (already ordered)       Opioid-induced constipation  On Suboxone, following with gastroenterology, per their recommendations taking Linzess daily plus MiraLAX and senna as needed    Orders:    polyethylene glycol (GLYCOLAX) 17 GM/SCOOP powder; Take 17 g by mouth daily as needed (constipation)    senna (SENOKOT) 8.6 mg; Take 1 tablet (8.6 mg total) by mouth daily at bedtime as needed for constipation    Dyslipidemia  ASCVD score 3.5, counseled on lifestyle modifications including diet and exercise.   Orders:    Semaglutide-Weight Management (Wegovy) 0.25 MG/0.5ML; Inject 0.25 mg under the skin weekly    Class 1 obesity due to excess calories with serious comorbidity and body mass index (BMI) of 30.0 to 30.9 in adult      Orders:    Semaglutide-Weight Management (Wegovy) 0.25 MG/0.5ML; Inject 0.25 mg under the skin weekly    Ambulatory Referral to Nutrition Services; Future    Need for hepatitis C screening test    Orders:    Hepatitis C Antibody; Future    Hepatitis C Antibody    Screening for HIV (human immunodeficiency virus)    Orders:    HIV 1/2 AG/AB w Reflex [facility] for 2 yr old and above; Future    Fatigue, unspecified  type    Orders:    Vitamin B12/Folate, Serum Panel; Future    Vitamin D 25 hydroxy; Future    Vitamin B12/Folate, Serum Panel    Vitamin D 25 hydroxy    Migraine without status migrainosus, not intractable, unspecified migraine type    Orders:    SUMAtriptan (IMITREX) 50 mg tablet; Take 1 tablet (50 mg total) by mouth once as needed for migraine may repeat in 2 hours if necessary       History of Present Illness     HPI  Patient presents to follow-up on lab work.  He reports chronic fatigue        Review of Systems   Constitutional:  Positive for fatigue. Negative for chills and fever.   Gastrointestinal:  Positive for constipation. Negative for abdominal pain, blood in stool and diarrhea.           Objective     /80   Pulse 77   Temp (!) 97.1 °F (36.2 °C)   Wt 87.1 kg (192 lb)   SpO2 99%   BMI 30.99 kg/m²     Physical Exam  Constitutional:       General: He is not in acute distress.     Appearance: He is obese. He is not ill-appearing, toxic-appearing or diaphoretic.   Neurological:      Mental Status: He is alert and oriented to person, place, and time.   Psychiatric:         Mood and Affect: Mood normal.         Behavior: Behavior normal.

## 2024-10-17 NOTE — ASSESSMENT & PLAN NOTE
Patient reports chronic fatigue and being on testosterone replacement in the past.  Recent lab work showed low testosterone x 2.    -Referral for endocrinology (already ordered)  -Lab workup with CBC, LH, FSH, and PSA (already ordered)

## 2024-10-18 ENCOUNTER — TELEPHONE (OUTPATIENT)
Dept: FAMILY MEDICINE CLINIC | Facility: HOSPITAL | Age: 49
End: 2024-10-18

## 2024-10-18 NOTE — TELEPHONE ENCOUNTER
PA for (Wegovy) 0.25 MG/0.5ML SUBMITTED     via    []CMM-KEY:   [x]Surescripts-Case ID # [id number]   []Availity-Auth ID # NDC #   []Faxed to plan   []Other website   []Phone call Case ID #     Office notes sent, clinical questions answered. Awaiting determination    Turnaround time for your insurance to make a decision on your Prior Authorization can take 7-21 business days.

## 2024-10-20 LAB
25(OH)D3+25(OH)D2 SERPL-MCNC: 24.9 NG/ML (ref 30–100)
FOLATE SERPL-MCNC: 3.1 NG/ML
HCV AB S/CO SERPL IA: NON REACTIVE
VIT B12 SERPL-MCNC: 646 PG/ML (ref 232–1245)

## 2024-10-21 DIAGNOSIS — E55.9 VITAMIN D INSUFFICIENCY: Primary | ICD-10-CM

## 2024-10-21 LAB
BASOPHILS # BLD AUTO: 0 X10E3/UL (ref 0–0.2)
BASOPHILS NFR BLD AUTO: 0 %
EOSINOPHIL # BLD AUTO: 0.2 X10E3/UL (ref 0–0.4)
EOSINOPHIL NFR BLD AUTO: 2 %
ERYTHROCYTE [DISTWIDTH] IN BLOOD BY AUTOMATED COUNT: 14 % (ref 11.6–15.4)
FSH SERPL-ACNC: 3.1 MIU/ML (ref 1.5–12.4)
HCT VFR BLD AUTO: 41.8 % (ref 37.5–51)
HGB BLD-MCNC: 14.2 G/DL (ref 13–17.7)
IMM GRANULOCYTES # BLD: 0 X10E3/UL (ref 0–0.1)
IMM GRANULOCYTES NFR BLD: 0 %
LH SERPL-ACNC: 2.7 MIU/ML (ref 1.7–8.6)
LYMPHOCYTES # BLD AUTO: 3.3 X10E3/UL (ref 0.7–3.1)
LYMPHOCYTES NFR BLD AUTO: 45 %
MCH RBC QN AUTO: 33.3 PG (ref 26.6–33)
MCHC RBC AUTO-ENTMCNC: 34 G/DL (ref 31.5–35.7)
MCV RBC AUTO: 98 FL (ref 79–97)
MONOCYTES # BLD AUTO: 0.5 X10E3/UL (ref 0.1–0.9)
MONOCYTES NFR BLD AUTO: 6 %
NEUTROPHILS # BLD AUTO: 3.4 X10E3/UL (ref 1.4–7)
NEUTROPHILS NFR BLD AUTO: 47 %
PLATELET # BLD AUTO: 244 X10E3/UL (ref 150–450)
PSA FREE MFR SERPL: 27.5 %
PSA FREE SERPL-MCNC: 0.11 NG/ML
PSA SERPL-MCNC: 0.4 NG/ML (ref 0–4)
RBC # BLD AUTO: 4.26 X10E6/UL (ref 4.14–5.8)
WBC # BLD AUTO: 7.3 X10E3/UL (ref 3.4–10.8)

## 2024-10-21 RX ORDER — OMEGA-3S/DHA/EPA/FISH OIL/D3 300MG-1000
800 CAPSULE ORAL DAILY
Qty: 180 TABLET | Refills: 1 | Status: SHIPPED | OUTPATIENT
Start: 2024-10-21

## 2024-10-21 NOTE — TELEPHONE ENCOUNTER
PA for (Wegovy) 0.25 MG/0.5ML APPROVED     Date(s) approved 10/18/2024 - 04/18/2025    Case #[id number]     Patient advised by          []MyChart Message  [x]Phone call   []LMOM  []L/M to call office as no active Communication consent on file  []Unable to leave detailed message as VM not approved on Communication consent       Pharmacy advised by    [x]Fax  []Phone call    Approval letter scanned into Media No , not available at time of approval
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
## 2024-12-10 ENCOUNTER — OFFICE VISIT (OUTPATIENT)
Dept: ENDOCRINOLOGY | Facility: CLINIC | Age: 49
End: 2024-12-10
Payer: COMMERCIAL

## 2024-12-10 VITALS
BODY MASS INDEX: 30.22 KG/M2 | HEIGHT: 66 IN | OXYGEN SATURATION: 97 % | WEIGHT: 188 LBS | DIASTOLIC BLOOD PRESSURE: 80 MMHG | HEART RATE: 72 BPM | SYSTOLIC BLOOD PRESSURE: 120 MMHG

## 2024-12-10 DIAGNOSIS — E55.9 VITAMIN D DEFICIENCY: ICD-10-CM

## 2024-12-10 DIAGNOSIS — R40.0 UNCONTROLLED DAYTIME SOMNOLENCE: ICD-10-CM

## 2024-12-10 DIAGNOSIS — R06.83 SNORING: ICD-10-CM

## 2024-12-10 DIAGNOSIS — Z86.39 HISTORY OF HYPOTHYROIDISM: ICD-10-CM

## 2024-12-10 DIAGNOSIS — R73.03 PREDIABETES: ICD-10-CM

## 2024-12-10 DIAGNOSIS — E66.811 CLASS 1 OBESITY DUE TO EXCESS CALORIES WITH SERIOUS COMORBIDITY AND BODY MASS INDEX (BMI) OF 30.0 TO 30.9 IN ADULT: ICD-10-CM

## 2024-12-10 DIAGNOSIS — R79.89 LOW TESTOSTERONE IN MALE: Primary | ICD-10-CM

## 2024-12-10 DIAGNOSIS — E66.09 CLASS 1 OBESITY DUE TO EXCESS CALORIES WITH SERIOUS COMORBIDITY AND BODY MASS INDEX (BMI) OF 30.0 TO 30.9 IN ADULT: ICD-10-CM

## 2024-12-10 PROCEDURE — 99244 OFF/OP CNSLTJ NEW/EST MOD 40: CPT | Performed by: INTERNAL MEDICINE

## 2024-12-10 NOTE — PATIENT INSTRUCTIONS
Get labs done between 7-9 am in fasting  Schedule appointment with urologist  Start taking vitamin D 2000 IU daily  Get sleep study done

## 2024-12-16 DIAGNOSIS — G43.909 MIGRAINE WITHOUT STATUS MIGRAINOSUS, NOT INTRACTABLE, UNSPECIFIED MIGRAINE TYPE: ICD-10-CM

## 2024-12-17 RX ORDER — SUMATRIPTAN 50 MG/1
50 TABLET, FILM COATED ORAL ONCE AS NEEDED
Qty: 9 TABLET | Refills: 2 | Status: SHIPPED | OUTPATIENT
Start: 2024-12-17

## 2024-12-21 LAB
SHBG SERPL-SCNC: 29.8 NMOL/L (ref 19.3–76.4)
T4 FREE SERPL-MCNC: 0.99 NG/DL (ref 0.82–1.77)
TESTOST FREE SERPL-MCNC: 6.2 PG/ML (ref 7.2–24)
TESTOST SERPL-MCNC: 244 NG/DL (ref 264–916)
TSH SERPL DL<=0.005 MIU/L-ACNC: 3.79 UIU/ML (ref 0.45–4.5)

## 2024-12-23 ENCOUNTER — RESULTS FOLLOW-UP (OUTPATIENT)
Dept: OTHER | Facility: HOSPITAL | Age: 49
End: 2024-12-23

## 2024-12-26 DIAGNOSIS — E66.09 CLASS 1 OBESITY DUE TO EXCESS CALORIES WITH SERIOUS COMORBIDITY AND BODY MASS INDEX (BMI) OF 30.0 TO 30.9 IN ADULT: ICD-10-CM

## 2024-12-26 DIAGNOSIS — E78.5 DYSLIPIDEMIA: ICD-10-CM

## 2024-12-26 DIAGNOSIS — R73.03 PREDIABETES: ICD-10-CM

## 2024-12-26 DIAGNOSIS — K59.04 CHRONIC IDIOPATHIC CONSTIPATION: ICD-10-CM

## 2024-12-26 DIAGNOSIS — E66.811 CLASS 1 OBESITY DUE TO EXCESS CALORIES WITH SERIOUS COMORBIDITY AND BODY MASS INDEX (BMI) OF 30.0 TO 30.9 IN ADULT: ICD-10-CM

## 2024-12-26 RX ORDER — SEMAGLUTIDE 0.25 MG/.5ML
0.25 INJECTION, SOLUTION SUBCUTANEOUS WEEKLY
OUTPATIENT
Start: 2024-12-26

## 2024-12-26 RX ORDER — SEMAGLUTIDE 0.5 MG/.5ML
INJECTION, SOLUTION SUBCUTANEOUS
Qty: 2 ML | Refills: 3 | Status: SHIPPED | OUTPATIENT
Start: 2024-12-26

## 2024-12-30 DIAGNOSIS — F32.A DEPRESSION, UNSPECIFIED DEPRESSION TYPE: ICD-10-CM

## 2024-12-31 DIAGNOSIS — E66.09 CLASS 1 OBESITY DUE TO EXCESS CALORIES WITH SERIOUS COMORBIDITY AND BODY MASS INDEX (BMI) OF 30.0 TO 30.9 IN ADULT: ICD-10-CM

## 2024-12-31 DIAGNOSIS — R73.03 PREDIABETES: ICD-10-CM

## 2024-12-31 DIAGNOSIS — E78.5 DYSLIPIDEMIA: ICD-10-CM

## 2024-12-31 DIAGNOSIS — E66.811 CLASS 1 OBESITY DUE TO EXCESS CALORIES WITH SERIOUS COMORBIDITY AND BODY MASS INDEX (BMI) OF 30.0 TO 30.9 IN ADULT: ICD-10-CM

## 2024-12-31 RX ORDER — BUPROPION HYDROCHLORIDE 300 MG/1
300 TABLET ORAL EVERY MORNING
Qty: 90 TABLET | Refills: 0 | Status: SHIPPED | OUTPATIENT
Start: 2024-12-31 | End: 2025-12-26

## 2025-01-01 RX ORDER — SEMAGLUTIDE 0.5 MG/.5ML
INJECTION, SOLUTION SUBCUTANEOUS
Qty: 2 ML | Refills: 0 | OUTPATIENT
Start: 2025-01-01

## 2025-01-15 ENCOUNTER — OFFICE VISIT (OUTPATIENT)
Dept: SLEEP CENTER | Facility: CLINIC | Age: 50
End: 2025-01-15
Payer: COMMERCIAL

## 2025-01-15 VITALS
DIASTOLIC BLOOD PRESSURE: 82 MMHG | WEIGHT: 188 LBS | SYSTOLIC BLOOD PRESSURE: 120 MMHG | HEIGHT: 66 IN | BODY MASS INDEX: 30.22 KG/M2

## 2025-01-15 DIAGNOSIS — E66.09 CLASS 1 OBESITY DUE TO EXCESS CALORIES WITH SERIOUS COMORBIDITY AND BODY MASS INDEX (BMI) OF 30.0 TO 30.9 IN ADULT: ICD-10-CM

## 2025-01-15 DIAGNOSIS — R40.0 UNCONTROLLED DAYTIME SOMNOLENCE: ICD-10-CM

## 2025-01-15 DIAGNOSIS — E66.811 CLASS 1 OBESITY DUE TO EXCESS CALORIES WITH SERIOUS COMORBIDITY AND BODY MASS INDEX (BMI) OF 30.0 TO 30.9 IN ADULT: ICD-10-CM

## 2025-01-15 DIAGNOSIS — G47.19 EXCESSIVE DAYTIME SLEEPINESS: Primary | ICD-10-CM

## 2025-01-15 DIAGNOSIS — Z72.821 POOR SLEEP HYGIENE: ICD-10-CM

## 2025-01-15 DIAGNOSIS — R06.83 SNORING: ICD-10-CM

## 2025-01-15 PROCEDURE — 99204 OFFICE O/P NEW MOD 45 MIN: CPT | Performed by: PSYCHIATRY & NEUROLOGY

## 2025-01-15 NOTE — PATIENT INSTRUCTIONS
Thank you for trusting me with your care!    I know we often  cover a lot of information at the visit, so if you have follow-up questions, are unclear about the plan, or feel there were important items that we did not discuss or you did not receive clarity on, please don't hesitate to reach out to me.     MyChart messages are preferred for routine matters.  Please make sure to call for urgent matters as there can be a delay in responding to questions over [employer].    It is very important to avoid driving while drowsy, this can be very dangerous or even cause serious injury or death.  If sleepy, it is not safe to get behind the wheel.  If you are driving and feels sleepy, it is very important to pull over right away.  Even losing control of the car for a split second can be deadly.  If you feel you cannot control when sleepiness occurs and cannot prevented, it is important to not drive at all until this improves.  Please let me know if you experience this as it is very important.       IMPORTANT- Prior to a sleep study (at home or in the sleep lab), I strongly recommend contacting your medical insurance first to understand your benefits (including deductible if applicable), coverage for this test, and out of pocket costs.  Even if the test is approved by medical insurance, the cost to you is determined by your medical benefits.   If you have concerns about your out of pocket costs for sleep testing, please contact me/the office before you complete the test and we can discuss if there are alternate options.     I recommend following this advice in general before any lab test, imaging test, doctor visit, surgery, or ordering CPAP supplies as it is best to understand your coverage to avoid unexpected bills after the fact.       Nursing Support:  When: Monday through Friday 7:30A-4:30PM except holidays  Where: Our direct line is [phone]  *3  *1.      If you are having a true emergency please call 911.  In the event  that the line is busy or it is after hours please leave a voice message and we will return your call.  Please speak clearly, leaving your full name, birth date, best number to reach you and the reason for your call.   Medication refills: We will need the name of the medication, the dosage, the ordering provider, whether you get a 30 or 90 day refill, and the pharmacy name and address.  Medications will be ordered by the provider only.  Nurses cannot call in prescriptions.  Please allow 7 days for medication refills.  Physician requested updates: If your provider requested that you call with an update after starting medication, please be ready to provide us the medication and dosage, what time you take your medication, the time you attempt to fall asleep, time you fall asleep, when you wake up, and what time you get out of bed.  Sleep Study Results: We will contact you with sleep study results and/or next steps after the physician has reviewed your testing.

## 2025-01-15 NOTE — PROGRESS NOTES
Name: Salazar Lindsay      : 1975      MRN: [id number]  Encounter Provider: Sacha Matute MD  Encounter Date: 1/15/2025   Encounter department: [facility]EP [facility]  :  Assessment & Plan  Excessive daytime sleepiness  -need to assess for sleep disordered breathing as he has excessive sleepiness  -We discussed that he is at risk for both obstructive sleep apnea as well as central sleep apnea (central apnea due to use of Suboxone)  -Will plan for diagnostic sleep study and I will follow-up with him thereafter  -If sleep apnea is identified, likely would recommend treatment given his excessive sleepiness  -If he does not have obstructive sleep apnea, or central apnea, potentially he could follow-up with me as needed.  It seems that he is not very bothered by his poor sleep habits.  That said he is excessively sleepy and does not sleep enough  -Reviewed drowsy driving precaution  Orders:  •  Diagnostic Sleep Study; Future    Class 1 obesity due to excess calories with serious comorbidity and body mass index (BMI) of 30.0 to 30.9 in adult    Orders:  •  Ambulatory Referral to Sleep Medicine    Poor sleep hygiene  -We discussed his sleep habits are very poor, he watches TV 24 hours a day, uses his phone a lot at night, sleeps on the couch and does not have consistent sleep patterns.  I advised that he should work on this and get a consistent routine.  She tried to use the bedroom only for sleep and use other rooms in the house for stress/work/unwinding.  -He thought this was reasonable and we will try to work on this  -He has been watching TV at night since 2001, with the news constantly on.  I advised that the news can be stressful and I would not recommend using this at night.  If he uses TV at night, would recommend putting on something relaxing that is not mentally stimulating           History of Present Illness   HPI          This is a 49-year-old male referred as a new patient due  "to daytime sleepiness, referred by endocrinology in December 2024.  Their notes describe that he has low testosterone, has been on chronic Suboxone.   This prompted referral to our office. PMH Bipolar 1, former hx opioid/cocaine use     Cc- referred by endocrinology ; \"I'm here to check a box [to get testosterone}\"    He has never had a sleep study before.  Has low energy and low testosterone.  He has never been told that he snores or stops breathing.  He does not have a primary sleep concern.  Chart notes describe that he previously was diagnosed with sleep apnea but he has never had a sleep study before per his report.    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  Is , has 2 kids    He is \"horrible with his sleep\".  Changed his sleep when he got a puppy a few years ago- this led to poor sleep habits.   Falls asleep on the couch or chair.  He goes to bed 11 pm, up 530 am. Wakes 2 times a night.  Sleep is disrupted as he gets notifications for work through the night.    TV is also in 24/7 since 9/11/2001.      Sleeps from 11 pm to 530 am in general.  Wakes on his own.       He feels super tired in the day.  Can doze if in a quiet situation.   No drowsy driving.      No restless legs.   No sleepwalking or dream enactment.         Sitting and reading: High chance of dozing  Watching TV: High chance of dozing  Sitting, inactive in a public place (e.g. a theatre or a meeting): Moderate chance of dozing  As a passenger in a car for an hour without a break: High chance of dozing  Lying down to rest in the afternoon when circumstances permit: High chance of dozing  Sitting and talking to someone: Would never doze  Sitting quietly after a lunch without alcohol: Slight chance of dozing  In a car, while stopped for a few minutes in traffic: Slight chance of dozing  Total score: 16     Review of Systems  Pertinent positives/negatives included in HPI and " "also as noted:       Objective   /82   Ht 5' 6\" (1.676 m)   Wt 85.3 kg (188 lb)   BMI 30.34 kg/m²     Neck Circumference: 16.25  Physical Exam      RRR  Lungs CTA b/l  In NAD   Mallampati 4 airway  Large tongue  Elongated soft palate     Data  Lab Results   Component Value Date    HGB 14.2 10/19/2024    HCT 41.8 10/19/2024    MCV 98 (H) 10/19/2024      Lab Results   Component Value Date    CALCIUM 8.5 11/23/2016    K 3.7 11/23/2016    CO2 29 11/23/2016     11/23/2016    BUN 13 11/23/2016    CREATININE 1.02 11/23/2016     No results found for: \"IRON\", \"TIBC\", \"FERRITIN\"  No results found for: \"AST\", \"ALT\"    Administrative Statements   I have spent a total time of 50 minutes in caring for this patient on the day of the visit/encounter including Prognosis, Risks and benefits of tx options, Instructions for management, Patient and family education, Importance of tx compliance, Risk factor reductions, Impressions, Counseling / Coordination of care, Documenting in the medical record, Reviewing / ordering tests, medicine, procedures  , and Obtaining or reviewing history  .   "

## 2025-02-10 ENCOUNTER — OFFICE VISIT (OUTPATIENT)
Dept: FAMILY MEDICINE CLINIC | Facility: HOSPITAL | Age: 50
End: 2025-02-10
Payer: COMMERCIAL

## 2025-02-10 VITALS
TEMPERATURE: 98.4 F | HEART RATE: 81 BPM | OXYGEN SATURATION: 98 % | SYSTOLIC BLOOD PRESSURE: 118 MMHG | WEIGHT: 190.6 LBS | HEIGHT: 66 IN | BODY MASS INDEX: 30.63 KG/M2 | DIASTOLIC BLOOD PRESSURE: 78 MMHG

## 2025-02-10 DIAGNOSIS — E66.09 CLASS 1 OBESITY DUE TO EXCESS CALORIES WITH SERIOUS COMORBIDITY AND BODY MASS INDEX (BMI) OF 30.0 TO 30.9 IN ADULT: ICD-10-CM

## 2025-02-10 DIAGNOSIS — E66.811 CLASS 1 OBESITY DUE TO EXCESS CALORIES WITH SERIOUS COMORBIDITY AND BODY MASS INDEX (BMI) OF 30.0 TO 30.9 IN ADULT: ICD-10-CM

## 2025-02-10 DIAGNOSIS — E66.9 OBESITY (BMI 30-39.9): ICD-10-CM

## 2025-02-10 DIAGNOSIS — R73.03 PREDIABETES: ICD-10-CM

## 2025-02-10 DIAGNOSIS — E78.5 DYSLIPIDEMIA: ICD-10-CM

## 2025-02-10 DIAGNOSIS — R35.89 POLYURIA: ICD-10-CM

## 2025-02-10 DIAGNOSIS — Z00.00 ANNUAL PHYSICAL EXAM: Primary | ICD-10-CM

## 2025-02-10 PROCEDURE — 99214 OFFICE O/P EST MOD 30 MIN: CPT

## 2025-02-10 PROCEDURE — 99396 PREV VISIT EST AGE 40-64: CPT

## 2025-02-10 RX ORDER — TAMSULOSIN HYDROCHLORIDE 0.4 MG/1
0.4 CAPSULE ORAL
Qty: 90 CAPSULE | Refills: 0 | Status: SHIPPED | OUTPATIENT
Start: 2025-02-10

## 2025-02-10 RX ORDER — SEMAGLUTIDE 0.25 MG/.5ML
INJECTION, SOLUTION SUBCUTANEOUS
Qty: 2 ML | Refills: 0 | Status: SHIPPED | OUTPATIENT
Start: 2025-02-10

## 2025-02-10 NOTE — ASSESSMENT & PLAN NOTE
Has had difficulty getting wegovy, discussed prescribing zepbound if available it better    Orders:    Semaglutide-Weight Management (Wegovy) 0.25 MG/0.5ML; Inject 0.25 mg under the skin weekly

## 2025-02-10 NOTE — PATIENT INSTRUCTIONS
"Patient Education     Routine physical for adults   The Basics   Written by the doctors and editors at [facility]   What is a physical? -- A physical is a routine visit, or \"check-up,\" with your doctor. You might also hear it called a \"wellness visit\" or \"preventive visit.\"  During each visit, the doctor will:   Ask about your physical and mental health   Ask about your habits, behaviors, and lifestyle   Do an exam   Give you vaccines if needed   Talk to you about any medicines you take   Give advice about your health   Answer your questions  Getting regular check-ups is an important part of taking care of your health. It can help your doctor find and treat any problems you have. But it's also important for preventing health problems.  A routine physical is different from a \"sick visit.\" A sick visit is when you see a doctor because of a health concern or problem. Since physicals are scheduled ahead of time, you can think about what you want to ask the doctor.  How often should I get a physical? -- It depends on your age and health. In general, for people age 21 years and older:   If you are younger than 50 years, you might be able to get a physical every 3 years.   If you are 50 years or older, your doctor might recommend a physical every year.  If you have an ongoing health condition, like diabetes or high blood pressure, your doctor will probably want to see you more often.  What happens during a physical? -- In general, each visit will include:   Physical exam - The doctor or nurse will check your height, weight, heart rate, and blood pressure. They will also look at your eyes and ears. They will ask about how you are feeling and whether you have any symptoms that bother you.   Medicines - It's a good idea to bring a list of all the medicines you take to each doctor visit. Your doctor will talk to you about your medicines and answer any questions. Tell them if you are having any side effects that bother you. You " "should also tell them if you are having trouble paying for any of your medicines.   Habits and behaviors - This includes:   Your diet   Your exercise habits   Whether you smoke, drink alcohol, or use drugs   Whether you are sexually active   Whether you feel safe at home  Your doctor will talk to you about things you can do to improve your health and lower your risk of health problems. They will also offer help and support. For example, if you want to quit smoking, they can give you advice and might prescribe medicines. If you want to improve your diet or get more physical activity, they can help you with this, too.   Lab tests, if needed - The tests you get will depend on your age and situation. For example, your doctor might want to check your:   Cholesterol   Blood sugar   Iron level   Vaccines - The recommended vaccines will depend on your age, health, and what vaccines you already had. Vaccines are very important because they can prevent certain serious or deadly infections.   Discussion of screening - \"Screening\" means checking for diseases or other health problems before they cause symptoms. Your doctor can recommend screening based on your age, risk, and preferences. This might include tests to check for:   Cancer, such as breast, prostate, cervical, ovarian, colorectal, prostate, lung, or skin cancer   Sexually transmitted infections, such as chlamydia and gonorrhea   Mental health conditions like depression and anxiety  Your doctor will talk to you about the different types of screening tests. They can help you decide which screenings to have. They can also explain what the results might mean.   Answering questions - The physical is a good time to ask the doctor or nurse questions about your health. If needed, they can refer you to other doctors or specialists, too.  Adults older than 65 years often need other care, too. As you get older, your doctor will talk to you about:   How to prevent falling at " home   Hearing or vision tests   Memory testing   How to take your medicines safely   Making sure that you have the help and support you need at home  All topics are updated as new evidence becomes available and our peer review process is complete.  This topic retrieved from [employer] on: May 02, 2024.  Topic 127859 Version 1.0  Release: 32.4.3 - C32.122  © 2024 UpToDate, Inc. and/or its affiliates. All rights reserved.  Consumer Information Use and Disclaimer   Disclaimer: This generalized information is a limited summary of diagnosis, treatment, and/or medication information. It is not meant to be comprehensive and should be used as a tool to help the user understand and/or assess potential diagnostic and treatment options. It does NOT include all information about conditions, treatments, medications, side effects, or risks that may apply to a specific patient. It is not intended to be medical advice or a substitute for the medical advice, diagnosis, or treatment of a health care provider based on the health care provider's examination and assessment of a patient's specific and unique circumstances. Patients must speak with a health care provider for complete information about their health, medical questions, and treatment options, including any risks or benefits regarding use of medications. This information does not endorse any treatments or medications as safe, effective, or approved for treating a specific patient. UpToDate, Inc. and its affiliates disclaim any warranty or liability relating to this information or the use thereof.The use of this information is governed by the Terms of Use, available at https://www.woltersNaubouwer.com/en/know/clinical-effectiveness-terms. 2024© UpToDate, Inc. and its affiliates and/or licensors. All rights reserved.  Copyright   © 2024 UpToDate, Inc. and/or its affiliates. All rights reserved.

## 2025-02-10 NOTE — PROGRESS NOTES
Adult Annual Physical  Name: Salazar Lindsay      : 1975      MRN: [id number]  Encounter Provider: Sage Jack MD  Encounter Date: 2/10/2025   Encounter department: [facility] [address]    Assessment & Plan  Annual physical exam         Obesity (BMI 30-39.9)      Counseled on lifestyle    Orders:    Semaglutide-Weight Management (Wegovy) 0.25 MG/0.5ML; Inject 0.25 mg under the skin weekly    Prediabetes    Orders:    Semaglutide-Weight Management (Wegovy) 0.25 MG/0.5ML; Inject 0.25 mg under the skin weekly    Dyslipidemia    Orders:    Semaglutide-Weight Management (Wegovy) 0.25 MG/0.5ML; Inject 0.25 mg under the skin weekly    Class 1 obesity due to excess calories with serious comorbidity and body mass index (BMI) of 30.0 to 30.9 in adult    Has had difficulty getting wegovy, discussed prescribing zepbound if available it better    Orders:    Semaglutide-Weight Management (Wegovy) 0.25 MG/0.5ML; Inject 0.25 mg under the skin weekly    Polyuria  Reports not feeling like he fully voids, suspicion for BPH, will trial flomax  Orders:    tamsulosin (FLOMAX) 0.4 mg; Take 1 capsule (0.4 mg total) by mouth daily with dinner      Immunizations and preventive care screenings were discussed with patient today. Appropriate education was printed on patient's after visit summary.        History of Present Illness     Adult Annual Physical:  Patient presents for annual physical.     General Health:  - Sleep: sleeps well.  - Vision: most recent eye exam < 1 year ago.  - Dental: no dental visits for > 1 year.     Health:  - History of STDs: no.   - Urinary symptoms: urinary frequency.     Review of Systems   Constitutional:  Negative for chills and fever.   Respiratory:  Negative for shortness of breath.    Cardiovascular:  Negative for chest pain and palpitations.   Gastrointestinal:  Negative for diarrhea, nausea and vomiting.   Endocrine: Positive for polyuria.   Genitourinary:   "Negative for dysuria and hematuria.   Neurological:  Negative for headaches.         Objective   /78   Pulse 81   Temp 98.4 °F (36.9 °C)   Ht 5' 6\" (1.676 m)   Wt 86.5 kg (190 lb 9.6 oz)   SpO2 98%   BMI 30.76 kg/m²     Physical Exam  Constitutional:       General: He is not in acute distress.     Appearance: He is obese. He is not ill-appearing, toxic-appearing or diaphoretic.   HENT:      Head: Normocephalic.      Right Ear: Tympanic membrane, ear canal and external ear normal. There is no impacted cerumen.      Left Ear: Tympanic membrane, ear canal and external ear normal. There is no impacted cerumen.      Mouth/Throat:      Mouth: Mucous membranes are moist.      Pharynx: Oropharynx is clear.   Eyes:      Conjunctiva/sclera: Conjunctivae normal.   Cardiovascular:      Rate and Rhythm: Normal rate and regular rhythm.      Heart sounds: Normal heart sounds. No murmur heard.  Pulmonary:      Effort: Pulmonary effort is normal. No respiratory distress.      Breath sounds: Normal breath sounds.   Neurological:      Mental Status: He is alert and oriented to person, place, and time.   Psychiatric:         Mood and Affect: Mood normal.         Behavior: Behavior normal.         "

## 2025-02-12 NOTE — PROGRESS NOTES
Name: Salazar Lindsay      : 1975      MRN: [id number]  Encounter Provider: JEFF Stafford  Encounter Date: 2025   Encounter department: [facility]  :  Assessment & Plan  BPH with obstruction/lower urinary tract symptoms  Patient reports frequent urination for many years   Going every hour during the day   Nocturia 2x/night   Patient denies burning, dysuria, flank/abdominal pain, gross hematuria   Patient unable to provide urine sample in the office today   PVR 7mls   Started this week on Tamsulosin 0.4mg nightly by his PCP   He has already noticed improvement since starting and is pleased   He will continue taking Tamsulosin 0.4mg - managed by his PCP   We discussed alternative medications such as anticholinergics as well as evaluation via cystoscopy to evaluate for bladder outlet surgery should symptoms worsen  Follow up as needed if symptoms persist or worsen   Orders:    POCT Measure PVR      History of Present Illness   Salazar Lindsay is a 49 y.o. male who presents as a new patient for evaluation of frequent urination and nocturia 2x/night. He reports this has been an ongoing problem for many years, though he was just accustomed to the frequent voiding habits. He was recently started on Tamsulosin 0.4mg nightly earlier this week by his PCP. He reports noticing improvement in his symptoms with decreased frequency already. He reports having very poor sleep habits- sleeps with the TV, uses his phone throughout the night, etc. So it is hard to decipher whether he is waking up to void or is voiding because he is already awake. He denies leakage, burning, dysuria, flank/abdominal pain, feelings of incomplete bladder emptying, gross hematuria, history of UTI's, or kidney stones. He reports having to void about every hour during the day.     Review of Systems   Constitutional:  Negative for chills, diaphoresis, fatigue and fever.   Respiratory:  Negative for cough and  "shortness of breath.    Gastrointestinal:  Negative for abdominal pain, diarrhea, nausea and vomiting.   Genitourinary:  Positive for frequency. Negative for decreased urine volume, difficulty urinating, dysuria, flank pain, hematuria and urgency.        Nocturia 2x   Musculoskeletal:  Negative for back pain and myalgias.   Skin:  Negative for pallor and wound.   Neurological:  Negative for dizziness, weakness, light-headedness and numbness.       Objective   /78 (BP Location: Left arm, Patient Position: Sitting, Cuff Size: Adult)   Pulse 79   Ht 5' 6\" (1.676 m)   Wt 86.2 kg (190 lb)   SpO2 99%   BMI 30.67 kg/m²     Physical Exam  Constitutional:       Appearance: Normal appearance.   HENT:      Head: Normocephalic and atraumatic.   Eyes:      Conjunctiva/sclera: Conjunctivae normal.   Pulmonary:      Effort: Pulmonary effort is normal. No respiratory distress.   Musculoskeletal:         General: Normal range of motion.      Cervical back: Normal range of motion.   Skin:     General: Skin is warm and dry.   Neurological:      General: No focal deficit present.      Mental Status: He is alert and oriented to person, place, and time.   Psychiatric:         Mood and Affect: Mood normal.         Behavior: Behavior normal.        Results  Lab Results   Component Value Date    PSA 0.4 10/19/2024     Lab Results   Component Value Date    CALCIUM 8.5 11/23/2016    K 3.7 11/23/2016    CO2 29 11/23/2016     11/23/2016    BUN 13 11/23/2016    CREATININE 1.02 11/23/2016     Lab Results   Component Value Date    WBC 7.3 10/19/2024    HGB 14.2 10/19/2024    HCT 41.8 10/19/2024    MCV 98 (H) 10/19/2024     10/19/2024       Office Urine Dip  Recent Results (from the past hour)   POCT Measure PVR    Collection Time: 02/14/25  3:03 PM   Result Value Ref Range    POST-VOID RESIDUAL VOLUME, ML POC 7 mL     "

## 2025-02-14 ENCOUNTER — OFFICE VISIT (OUTPATIENT)
Dept: UROLOGY | Facility: HOSPITAL | Age: 50
End: 2025-02-14
Payer: COMMERCIAL

## 2025-02-14 VITALS
OXYGEN SATURATION: 99 % | HEART RATE: 79 BPM | DIASTOLIC BLOOD PRESSURE: 78 MMHG | HEIGHT: 66 IN | WEIGHT: 190 LBS | SYSTOLIC BLOOD PRESSURE: 132 MMHG | BODY MASS INDEX: 30.53 KG/M2

## 2025-02-14 DIAGNOSIS — N40.1 BPH WITH OBSTRUCTION/LOWER URINARY TRACT SYMPTOMS: Primary | ICD-10-CM

## 2025-02-14 DIAGNOSIS — N13.8 BPH WITH OBSTRUCTION/LOWER URINARY TRACT SYMPTOMS: Primary | ICD-10-CM

## 2025-02-14 PROBLEM — R35.0 BENIGN PROSTATIC HYPERPLASIA WITH URINARY FREQUENCY: Status: ACTIVE | Noted: 2025-02-14

## 2025-02-14 PROBLEM — R39.14 BENIGN PROSTATIC HYPERPLASIA WITH INCOMPLETE BLADDER EMPTYING: Status: ACTIVE | Noted: 2025-02-14

## 2025-02-14 LAB — POST-VOID RESIDUAL VOLUME, ML POC: 7 ML

## 2025-02-14 PROCEDURE — 51798 US URINE CAPACITY MEASURE: CPT

## 2025-02-14 PROCEDURE — 99204 OFFICE O/P NEW MOD 45 MIN: CPT

## 2025-02-14 NOTE — ASSESSMENT & PLAN NOTE
Patient reports frequent urination for many years   Going every hour during the day   Nocturia 2x/night   Patient denies burning, dysuria, flank/abdominal pain, gross hematuria   Patient unable to provide urine sample in the office today   PVR 7mls   Started this week on Tamsulosin 0.4mg nightly by his PCP   He has already noticed improvement since starting and is pleased   He will continue taking Tamsulosin 0.4mg - managed by his PCP   We discussed alternative medications such as anticholinergics as well as evaluation via cystoscopy to evaluate for bladder outlet surgery should symptoms worsen  Follow up as needed if symptoms persist or worsen   Orders:    POCT Measure PVR

## 2025-03-07 DIAGNOSIS — E66.811 CLASS 1 OBESITY DUE TO EXCESS CALORIES WITH SERIOUS COMORBIDITY AND BODY MASS INDEX (BMI) OF 30.0 TO 30.9 IN ADULT: ICD-10-CM

## 2025-03-07 DIAGNOSIS — E66.9 OBESITY (BMI 30-39.9): ICD-10-CM

## 2025-03-07 DIAGNOSIS — E78.5 DYSLIPIDEMIA: ICD-10-CM

## 2025-03-07 DIAGNOSIS — E66.09 CLASS 1 OBESITY DUE TO EXCESS CALORIES WITH SERIOUS COMORBIDITY AND BODY MASS INDEX (BMI) OF 30.0 TO 30.9 IN ADULT: ICD-10-CM

## 2025-03-07 DIAGNOSIS — R73.03 PREDIABETES: ICD-10-CM

## 2025-03-10 RX ORDER — SEMAGLUTIDE 0.5 MG/.5ML
INJECTION, SOLUTION SUBCUTANEOUS
Qty: 2 ML | Refills: 5 | Status: SHIPPED | OUTPATIENT
Start: 2025-03-10

## 2025-03-20 ENCOUNTER — TELEPHONE (OUTPATIENT)
Dept: GASTROENTEROLOGY | Facility: CLINIC | Age: 50
End: 2025-03-20

## 2025-03-20 ENCOUNTER — OFFICE VISIT (OUTPATIENT)
Dept: GASTROENTEROLOGY | Facility: CLINIC | Age: 50
End: 2025-03-20
Payer: COMMERCIAL

## 2025-03-20 VITALS
BODY MASS INDEX: 29.73 KG/M2 | SYSTOLIC BLOOD PRESSURE: 140 MMHG | WEIGHT: 185 LBS | HEIGHT: 66 IN | DIASTOLIC BLOOD PRESSURE: 96 MMHG

## 2025-03-20 DIAGNOSIS — K59.09 CHRONIC CONSTIPATION WITH OVERFLOW: Primary | ICD-10-CM

## 2025-03-20 DIAGNOSIS — Z15.09 LYNCH SYNDROME: ICD-10-CM

## 2025-03-20 PROCEDURE — 99214 OFFICE O/P EST MOD 30 MIN: CPT | Performed by: INTERNAL MEDICINE

## 2025-03-20 RX ORDER — POLYETHYLENE GLYCOL 3350 17 G/17G
17 POWDER, FOR SOLUTION ORAL DAILY
Qty: 238 G | Refills: 1 | Status: SHIPPED | OUTPATIENT
Start: 2025-03-20

## 2025-03-20 RX ORDER — MAGNESIUM CARB/ALUMINUM HYDROX 105-160MG
296 TABLET,CHEWABLE ORAL 2 TIMES DAILY
Qty: 296 ML | Refills: 1 | Status: SHIPPED | OUTPATIENT
Start: 2025-03-20

## 2025-03-20 NOTE — PROGRESS NOTES
"Name: Salazar Lindsay      : 1975      MRN: [id number]  Encounter Provider: Ez Piedra DO  Encounter Date: 3/20/2025   Encounter department: [facility]  :  Assessment & Plan  Chronic constipation with overflow  Likely exacerbated by Wegovy.  Hopefully he will feel better on the lower dose.  But for now, to get him out of the overflow state I have recommended 3 bottles of magnesium citrate and to continue Linzess.  Going forward instructed him to use MiraLAX as needed if he gets backed up while on Linzess instead of senna.  He will follow-up in 2 months  Orders:    magnesium citrate (CITROMA) 1.745 g/30 mL oral solution; Take 296 mL by mouth 2 (two) times a day    polyethylene glycol (GLYCOLAX) 17 GM/SCOOP powder; Take 17 g by mouth daily    Ingram syndrome  He is due for his colonoscopy this fall which we will arrange at his next visit follow-up          History of Present Illness   HPI  Salazar Lindsay is a 49 y.o. male who presents due to Ingram syndrome as well asissues with constipation and overflow diarrhea.  He started on Wegovy and unfortunately almost immediately developed GI symptoms including severe constipation.  And spite of taking Linzess 290 mcg, he has not had a bowel movement in 5 days.  He has tried senna, however is giving him nothing but diarrhea and he is having accidents.      Review of Systems       Objective   /96   Ht 5' 6\" (1.676 m)   Wt 83.9 kg (185 lb)   BMI 29.86 kg/m²      Physical Exam  General Appearance: NAD, cooperative, alert  Eyes: Anicteric  GI:  Soft, non-tender, non-distended; normal bowel sounds; no masses, no organomegaly   Rectal: Deferred  Musculoskeletal: No edema.  Skin:  No jaundice      " Hearing aids and glasses case in room on nightstand, patient labels on placed on items. OR tomorrow AM. Consent signed in soft chart.

## 2025-03-20 NOTE — TELEPHONE ENCOUNTER
Fax Prior Auth For [facility] Citrate  1.745GM/30ML [facility] to [facility] [place]      Key [id number]

## 2025-03-24 NOTE — TELEPHONE ENCOUNTER
Please be advised the prep [facility] Magnesium Citrate 1.745GM/30ML solution   needs a prior auth.  A clinical reason is needed so a prior auth can be done by the PA team of why this particular prep is needed with tired and fails of other preps with documentation.  Otherwise, please advise the Provider so a different prep can be prescribed.  Thank you

## 2025-03-25 DIAGNOSIS — F32.A DEPRESSION, UNSPECIFIED DEPRESSION TYPE: ICD-10-CM

## 2025-03-25 NOTE — TELEPHONE ENCOUNTER
Problem: Patient Care Overview  Goal: Interprofessional Rounds/Family Conf  Outcome: Ongoing (interventions implemented as appropriate)   08/03/19 1020   Interdisciplinary Rounds/Family Conf   Summary Patient sitting up in a chair, daughter at bedside, Dr. Colbert assessing patient. Patient has mild pain, nerve block stopped today, catheter was left in-just in case needed for pain control.Dr. Colbert requested Dr. Morin manage pain medications due to side effects that patient experienced in the past. Dr. Morin aware and already adjusted medication and spoke with nurse,  Patient c/o itching on back and abd. Daughter understands that Dr. Morin added tylenol and will be on call for any adjustment needed for pain medication. Palliative to continue to follow.   Participants nursing;physician          Sent [employer] message to pt that mag citrate is over the counter and he can get it at any pharmacy.

## 2025-03-26 ENCOUNTER — TELEPHONE (OUTPATIENT)
Age: 50
End: 2025-03-26

## 2025-03-26 RX ORDER — BUPROPION HYDROCHLORIDE 300 MG/1
300 TABLET ORAL EVERY MORNING
Qty: 90 TABLET | Refills: 1 | Status: SHIPPED | OUTPATIENT
Start: 2025-03-26 | End: 2026-03-21

## 2025-04-23 DIAGNOSIS — F32.A DEPRESSION, UNSPECIFIED DEPRESSION TYPE: ICD-10-CM

## 2025-04-24 RX ORDER — BUPROPION HYDROCHLORIDE 300 MG/1
300 TABLET ORAL EVERY MORNING
Qty: 90 TABLET | Refills: 0 | OUTPATIENT
Start: 2025-04-24 | End: 2026-04-19

## 2025-04-28 ENCOUNTER — HOSPITAL ENCOUNTER (OUTPATIENT)
Dept: RADIOLOGY | Facility: HOSPITAL | Age: 50
Discharge: HOME | End: 2025-04-28
Payer: COMMERCIAL

## 2025-04-28 DIAGNOSIS — M25.561 RIGHT KNEE PAIN, UNSPECIFIED CHRONICITY: ICD-10-CM

## 2025-04-28 DIAGNOSIS — M25.561 RIGHT KNEE PAIN, UNSPECIFIED CHRONICITY: Primary | ICD-10-CM

## 2025-04-28 PROCEDURE — 73564 X-RAY EXAM KNEE 4 OR MORE: CPT

## 2025-04-28 NOTE — PROGRESS NOTES
Orthopaedic Surgery - Office Note  Salazar Lindsay (49 y.o. male)   : 1975   MRN: [id number]  Encounter Date: 2025    Chief Complaint   Patient presents with    Right Knee - Pain         Assessment & Plan  Acute pain of right knee    Orders:    Ambulatory Referral to Physical Therapy; Future    Durable Medical Equipment    Primary osteoarthritis of right knee-mild medially    Orders:    Ambulatory Referral to Physical Therapy; Future    Durable Medical Equipment    Patellofemoral disorder of right knee  The diagnosis as well as treatment options were reviewed with the patient in the office today.  I advised the patient this should not require surgical intervention but will benefit from physical therapy.  He will ice the knee for comfort 20 minutes on 1 hour off 3 times a day.  He may use an oral anti-inflammatory such as Aleve 1 tablet twice daily with food stopping calling if any stomach upset occurs.  He will be provided a patellofemoral stabilization brace to be worn when active and walking the dog until he restores his full motion strength and decrease his symptoms with PT and the home exercise program.  He will return in 4 weeks for repeat evaluation at which time we could consider an injection, additional therapy, and/or advanced imaging if his symptoms have not improved with conservative care.  All question concerns were answered in the office today.          Return for Recheck in 4 weeks with myself.        History of Present Illness  This is a new patient with right knee pain.  Patient reports the pain started about a week ago while walking his dog.  He states the dog pulled him forward awkwardly while chasing a rabbit whenever he put his full weight on an extended right knee.  He has had some degree of anterior knee discomfort that waxes and wanes depending on activity level.  He reports discomfort in the anterior knee with going up and down stairs.  He has not noticed any swelling.  He  "denies any mechanical locking or clicking.  He has not had problems with the knee in the past.    Patient is not diabetic.    Review of Systems  Pertinent items are noted in HPI.  All other systems were reviewed and are negative.    Physical Exam  Ht 5' 6\" (1.676 m)   Wt 83.9 kg (185 lb)   BMI 29.86 kg/m²   Cons: Appears well.  No apparent distress.  Psych: Alert. Oriented x3.  Mood and affect normal.    Right knee is without intra-articular effusion.  There is no tenderness on the medial or lateral joint line.  He has a negative medial and lateral Salina's.  There is no pain or instability with valgus and varus stressing.  He has full extension and flexes to 130 degrees.  Quad strength is decreased to 4 out of 5.  He has hamstring strength 5 out of 5.  He is tender on the medial and lateral border of the patella with marked patella apprehension and retropatellar crepitus.  His gait is heel-to-toe in the office today.  There is no calf tenderness and a negative Homans.  He has a negative straight leg raise.          Studies Reviewed  Independent review of x-rays performed on 4/28/2025 by myself today in the office show no acute fractures or dislocations.  Medial joint space narrowing is noted with early osteophyte formation seen.  No bone-on-bone deformity is noted.  X-rays were reviewed from orthopedic standpoint will await official radiologist interpretation.    Procedures  No procedures today.    Medical, Surgical, Family, and Social History  The patient's medical history, family history, and social history, were reviewed and updated as appropriate.    Past Medical History:   Diagnosis Date    Alcohol abuse     Bipolar 1 disorder (HCC)     Bursitis of elbow     right    Drug abuse (HCC)     Drug addiction in remission (HCC)     heroin, and alcohol    Ingram syndrome        Past Surgical History:   Procedure Laterality Date    EGD AND COLONOSCOPY  10/01/2024    SMALL INTESTINE SURGERY         Family History "   Problem Relation Age of Onset    Colon polyps Mother     Colon cancer Mother     Ingram Syndrome Mother     Ovarian cancer Mother     Other Mother         Ingram Syndrome    Other Sister         Ingram Syndrome    Ingram Syndrome Maternal Grandmother     Colon polyps Maternal Grandfather     Colon cancer Maternal Grandfather     Ingram Syndrome Paternal Grandmother     Other Maternal Aunt         Ingram Syndrome    Other Maternal Uncle         Ingram Syndrome       Social History     Occupational History    Not on file   Tobacco Use    Smoking status: Never    Smokeless tobacco: Current     Types: Chew   Vaping Use    Vaping status: Never Used   Substance and Sexual Activity    Alcohol use: No    Drug use: No    Sexual activity: Yes       Allergies   Allergen Reactions    Contrast [Iodinated Contrast Media] Anaphylaxis    Sulfamethoxazole-Trimethoprim Vomiting         Current Outpatient Medications:     buprenorphine-naloxone (SUBOXONE) 8-2 mg per SL tablet, Place 1 tablet under the tongue daily., Disp: , Rfl:     buPROPion (WELLBUTRIN XL) 300 mg 24 hr tablet, Take 1 tablet (300 mg total) by mouth every morning, Disp: 90 tablet, Rfl: 1    cholecalciferol (VITAMIN D3) 400 units tablet, Take 2 tablets (800 Units total) by mouth daily, Disp: 180 tablet, Rfl: 1    ibuprofen (MOTRIN) 600 mg tablet, Take 1 tablet (600 mg total) by mouth every 8 (eight) hours as needed for mild pain or fever, Disp: 30 tablet, Rfl: 0    linaCLOtide 290 MCG CAPS, Take 1 capsule by mouth in the morning, Disp: 30 capsule, Rfl: 5    magnesium citrate (CITROMA) 1.745 g/30 mL oral solution, Take 296 mL by mouth 2 (two) times a day, Disp: 296 mL, Rfl: 1    polyethylene glycol (GLYCOLAX) 17 GM/SCOOP powder, Take 17 g by mouth daily as needed (constipation), Disp: , Rfl:     polyethylene glycol (GLYCOLAX) 17 GM/SCOOP powder, Take 17 g by mouth daily, Disp: 238 g, Rfl: 1    Semaglutide-Weight Management (Wegovy) 0.5 MG/0.5ML, Inject 0.5 mg under the skin  weekly, Disp: 2 mL, Rfl: 5    SUMAtriptan (IMITREX) 50 mg tablet, TAKE 1 TABLET (50 MG TOTAL) BY MOUTH ONCE AS NEEDED FOR MIGRAINE MAY REPEAT IN 2 HOURS IF NECESSARY, Disp: 9 tablet, Rfl: 2    tamsulosin (FLOMAX) 0.4 mg, Take 1 capsule (0.4 mg total) by mouth daily with dinner, Disp: 90 capsule, Rfl: 0      Trip Correia PA-C

## 2025-04-29 ENCOUNTER — OFFICE VISIT (OUTPATIENT)
Dept: OBGYN CLINIC | Facility: CLINIC | Age: 50
End: 2025-04-29
Payer: COMMERCIAL

## 2025-04-29 VITALS — WEIGHT: 185 LBS | BODY MASS INDEX: 29.73 KG/M2 | HEIGHT: 66 IN

## 2025-04-29 DIAGNOSIS — M22.2X1 PATELLOFEMORAL DISORDER OF RIGHT KNEE: ICD-10-CM

## 2025-04-29 DIAGNOSIS — M25.561 ACUTE PAIN OF RIGHT KNEE: Primary | ICD-10-CM

## 2025-04-29 DIAGNOSIS — M17.11 PRIMARY OSTEOARTHRITIS OF RIGHT KNEE: ICD-10-CM

## 2025-04-29 PROCEDURE — 99203 OFFICE O/P NEW LOW 30 MIN: CPT | Performed by: PHYSICIAN ASSISTANT

## 2025-04-29 NOTE — PATIENT INSTRUCTIONS
"Patient Education     Patellofemoral pain   The Basics   Written by the doctors and editors at [facility]   What is patellofemoral pain? -- Patellofemoral pain is a condition that causes pain in the front of the knee. It involves the knee cap, which doctors call the \"patella\" (figure 1).  Many times, patellofemoral pain happens in runners or other people who put a lot of pressure on their knees. But it can also happen when a person's knee cap gets out of line with the knee joint.  What are the symptoms of patellofemoral pain? -- Patellofemoral pain causes pain in the front of the knee, or around or behind the knee cap. The pain can start slowly or quickly. The pain is usually worse when you squat, run, or sit for a long time. You might also feel as if your knee is giving out.  Is there a test for patellofemoral pain? -- No test can tell for sure if you have patellofemoral pain. But your doctor or nurse should be able to tell if you have the condition by learning about your symptoms and doing an exam.  To make sure that your symptoms aren't caused by another condition, your doctor might order an X-ray or imaging test of your knee. Imaging tests create pictures of the inside of the body.  How is patellofemoral pain treated? -- Long term, the most important treatment is strengthening the muscles around your knees and hips. A physical therapist (exercise expert) can teach you exercises to do. This usually also involves strengthening the \"core\" muscles in your belly and lower back.  When you are having pain, these things might help:   Rest your knee and avoiding activities or movements that make the pain worse.   Take nonsteroidal antiinflammatory drugs, also called \"NSAIDs\" - NSAIDs are a large group of medicines that includes ibuprofen (sample brand names: Advil, Motrin) and naproxen (sample brand names: Aleve, Naprosyn). While they can help relieve short-term pain, they should not be used to treat patellofemoral pain " for longer than 2 or 3 weeks.   Put ice on your knee when it hurts or after activities that cause pain - You can put a cold gel pack, bag of ice, or bag of frozen vegetables on the painful area every 1 to 2 hours, for 15 minutes each time. Put a thin towel between the ice (or other cold object) and your skin.  Your doctor might also recommend the following, along with physical therapy:   Wear a knee brace to support your knee.   Tape up your knee in a certain way to support your knee.   Wear special shoe inserts made to fit your foot (to keep your foot from turning in or out too much).  Your symptoms will most likely improve with treatment, especially physical therapy. If they don't, your doctor might have you see a knee specialist to discuss treating your condition with surgery. But this is rare.  How can I prevent getting patellofemoral pain again? -- You can reduce your chances of getting this condition again by doing the exercises and stretches that your doctor or physical therapist shows you. Strengthening your muscles helps reduce the amount of stress on your knees.  All topics are updated as new evidence becomes available and our peer review process is complete.  This topic retrieved from [employer] on: Feb 26, 2024.  Topic 61341 Version 12.0  Release: 32.2.4 - C32.56  © 2024 UpToDate, Inc. and/or its affiliates. All rights reserved.  figure 1: Right knee     Graphic 51112 Version 2.0  Consumer Information Use and Disclaimer   Disclaimer: This generalized information is a limited summary of diagnosis, treatment, and/or medication information. It is not meant to be comprehensive and should be used as a tool to help the user understand and/or assess potential diagnostic and treatment options. It does NOT include all information about conditions, treatments, medications, side effects, or risks that may apply to a specific patient. It is not intended to be medical advice or a substitute for the medical advice,  diagnosis, or treatment of a health care provider based on the health care provider's examination and assessment of a patient's specific and unique circumstances. Patients must speak with a health care provider for complete information about their health, medical questions, and treatment options, including any risks or benefits regarding use of medications. This information does not endorse any treatments or medications as safe, effective, or approved for treating a specific patient. UpToDate, Inc. and its affiliates disclaim any warranty or liability relating to this information or the use thereof.The use of this information is governed by the Terms of Use, available at https://www.[employer] MED[employer]INEuwer.com/en/know/clinical-effectiveness-terms. 2024© UpToDate, Inc. and its affiliates and/or licensors. All rights reserved.  Copyright   © 2024 UpToDate, Inc. and/or its affiliates. All rights reserved.

## 2025-05-06 ENCOUNTER — TELEPHONE (OUTPATIENT)
Dept: FAMILY MEDICINE CLINIC | Facility: HOSPITAL | Age: 50
End: 2025-05-06

## 2025-05-07 NOTE — TELEPHONE ENCOUNTER
PA for (Wegovy) 0.5 MG/0.5ML APPROVED     Date(s) approved 05/07/25-11/07/25    Case # [id number]    Patient advised by          []MyChart Message  [x]Phone call   [x]LMOM  []L/M to call office as no active Communication consent on file  []Unable to leave detailed message as VM not approved on Communication consent       Pharmacy advised by    [x]Fax  []Phone call  []Secure Chat       Approval letter scanned into [employer] , not available at time of approval

## 2025-05-07 NOTE — TELEPHONE ENCOUNTER
PA for (Wegovy) 0.5 MG/0.5ML SUBMITTED to [employer]    via    []CMM-KEY:   [x]Surescripts-Case ID # [id number]   []Availity-Auth ID # NDC #  []Faxed to plan   []Other website   []Phone call Case ID #     [x]PA sent as URGENT    All office notes, labs and other pertaining documents and studies sent. Clinical questions answered. Awaiting determination from insurance company.     Turnaround time for your insurance to make a decision on your Prior Authorization can take 7-21 business days.

## 2025-05-09 DIAGNOSIS — R35.89 POLYURIA: ICD-10-CM

## 2025-05-09 RX ORDER — TAMSULOSIN HYDROCHLORIDE 0.4 MG/1
0.4 CAPSULE ORAL
Qty: 90 CAPSULE | Refills: 1 | Status: SHIPPED | OUTPATIENT
Start: 2025-05-09

## 2025-05-14 ENCOUNTER — EVALUATION (OUTPATIENT)
Dept: PHYSICAL THERAPY | Facility: CLINIC | Age: 50
End: 2025-05-14
Attending: PHYSICIAN ASSISTANT
Payer: COMMERCIAL

## 2025-05-14 DIAGNOSIS — M25.561 ACUTE PAIN OF RIGHT KNEE: Primary | ICD-10-CM

## 2025-05-14 PROCEDURE — 97110 THERAPEUTIC EXERCISES: CPT | Performed by: PHYSICAL THERAPIST

## 2025-05-14 PROCEDURE — 97161 PT EVAL LOW COMPLEX 20 MIN: CPT | Performed by: PHYSICAL THERAPIST

## 2025-05-21 ENCOUNTER — OFFICE VISIT (OUTPATIENT)
Dept: GASTROENTEROLOGY | Facility: CLINIC | Age: 50
End: 2025-05-21

## 2025-05-21 VITALS
BODY MASS INDEX: 30.12 KG/M2 | WEIGHT: 187.4 LBS | DIASTOLIC BLOOD PRESSURE: 80 MMHG | SYSTOLIC BLOOD PRESSURE: 138 MMHG | HEIGHT: 66 IN

## 2025-05-21 DIAGNOSIS — K59.09 CHRONIC CONSTIPATION WITH OVERFLOW: Primary | ICD-10-CM

## 2025-05-21 DIAGNOSIS — T40.2X5A OPIOID-INDUCED CONSTIPATION: ICD-10-CM

## 2025-05-21 DIAGNOSIS — Z15.09 LYNCH SYNDROME: ICD-10-CM

## 2025-05-21 DIAGNOSIS — K59.03 OPIOID-INDUCED CONSTIPATION: ICD-10-CM

## 2025-05-21 RX ORDER — POLYETHYLENE GLYCOL 3350 17 G/17G
8.5 POWDER, FOR SOLUTION ORAL DAILY
Qty: 238 G | Refills: 1 | Status: SHIPPED | OUTPATIENT
Start: 2025-05-21

## 2025-05-21 NOTE — PROGRESS NOTES
"Name: Salazar Lindsay      : 1975      MRN: [id number]  Encounter Provider: Ez Piedra DO  Encounter Date: 2025   Encounter department: [facility]  :  Assessment & Plan  Chronic constipation with overflow  He is seemingly having a difficult time taking his Linzess regularly, so I did reiterate that he needs to take it daily.  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 male who presents in follow-up due to constipation with overflow secondary to opioids.  He also has Ingram syndrome and is due for colonoscopy later this year.  He states that overall he is much improved on Linzess.  Apparently he is having a difficult time remembering taking the Linzess.  For example, if he realizes he has not taken it in a couple of days, he will double up on the capsule and then have lots of diarrhea.  He denies abdominal pain and bloating.      Review of Systems       Objective   /80   Ht 5' 6\" (1.676 m)   Wt 85 kg (187 lb 6.4 oz)   BMI 30.25 kg/m²      Physical Exam  General Appearance: NAD, cooperative, alert  Eyes: Anicteric  GI:  Soft, non-tender, non-distended; normal bowel sounds; no masses, no organomegaly   Rectal: Deferred  Musculoskeletal: No edema.  Skin:  No jaundice      "

## 2025-07-26 DIAGNOSIS — T40.2X5A OPIOID-INDUCED CONSTIPATION: ICD-10-CM

## 2025-07-26 DIAGNOSIS — K59.03 OPIOID-INDUCED CONSTIPATION: ICD-10-CM

## 2025-07-28 RX ORDER — POLYETHYLENE GLYCOL 3350 17 G/17G
POWDER, FOR SOLUTION ORAL
Qty: 238 G | Refills: 1 | Status: SHIPPED | OUTPATIENT
Start: 2025-07-28

## 2025-08-19 ENCOUNTER — OFFICE VISIT (OUTPATIENT)
Dept: OBGYN CLINIC | Facility: CLINIC | Age: 50
End: 2025-08-19
Payer: COMMERCIAL

## 2025-08-19 VITALS — WEIGHT: 193 LBS | BODY MASS INDEX: 31.02 KG/M2 | HEIGHT: 66 IN

## 2025-08-19 DIAGNOSIS — M25.561 CHRONIC PAIN OF RIGHT KNEE: ICD-10-CM

## 2025-08-19 DIAGNOSIS — M23.91 INTERNAL DERANGEMENT OF KNEE, RIGHT: ICD-10-CM

## 2025-08-19 DIAGNOSIS — M17.11 PRIMARY OSTEOARTHRITIS OF RIGHT KNEE: Primary | ICD-10-CM

## 2025-08-19 DIAGNOSIS — M22.2X1 PATELLOFEMORAL DISORDER OF RIGHT KNEE: ICD-10-CM

## 2025-08-19 DIAGNOSIS — G89.29 CHRONIC PAIN OF RIGHT KNEE: ICD-10-CM

## 2025-08-19 PROCEDURE — 99213 OFFICE O/P EST LOW 20 MIN: CPT | Performed by: PHYSICIAN ASSISTANT

## 2025-08-19 RX ORDER — BUPRENORPHINE AND NALOXONE 8; 2 MG/1; MG/1
FILM, SOLUBLE BUCCAL; SUBLINGUAL
COMMUNITY
Start: 2025-07-26

## 2025-08-20 ENCOUNTER — PATIENT MESSAGE (OUTPATIENT)
Dept: FAMILY MEDICINE CLINIC | Facility: HOSPITAL | Age: 50
End: 2025-08-20

## 2025-08-20 DIAGNOSIS — E66.9 OBESITY (BMI 30-39.9): ICD-10-CM

## 2025-08-20 DIAGNOSIS — E66.9 OBESITY, UNSPECIFIED CLASS, UNSPECIFIED OBESITY TYPE, UNSPECIFIED WHETHER SERIOUS COMORBIDITY PRESENT: Primary | ICD-10-CM

## 2025-08-20 DIAGNOSIS — Z11.4 SCREENING FOR HIV (HUMAN IMMUNODEFICIENCY VIRUS): ICD-10-CM

## 2025-08-20 DIAGNOSIS — E55.9 VITAMIN D DEFICIENCY: ICD-10-CM

## 2025-08-20 DIAGNOSIS — E29.1 HYPOGONADISM IN MALE: ICD-10-CM

## 2025-08-21 DIAGNOSIS — K59.04 CHRONIC IDIOPATHIC CONSTIPATION: ICD-10-CM

## 2025-08-22 RX ORDER — LINACLOTIDE 290 UG/1
CAPSULE, GELATIN COATED ORAL EVERY MORNING
Qty: 30 CAPSULE | Refills: 5 | Status: SHIPPED | OUTPATIENT
Start: 2025-08-22